# Patient Record
Sex: MALE | Race: WHITE | Employment: FULL TIME | URBAN - NONMETROPOLITAN AREA
[De-identification: names, ages, dates, MRNs, and addresses within clinical notes are randomized per-mention and may not be internally consistent; named-entity substitution may affect disease eponyms.]

---

## 2019-11-18 ENCOUNTER — OFFICE VISIT (OUTPATIENT)
Dept: FAMILY MEDICINE CLINIC | Facility: CLINIC | Age: 54
End: 2019-11-18
Payer: COMMERCIAL

## 2019-11-18 VITALS
SYSTOLIC BLOOD PRESSURE: 136 MMHG | BODY MASS INDEX: 21.42 KG/M2 | HEART RATE: 88 BPM | RESPIRATION RATE: 16 BRPM | WEIGHT: 127 LBS | HEIGHT: 64.5 IN | DIASTOLIC BLOOD PRESSURE: 88 MMHG | TEMPERATURE: 99 F

## 2019-11-18 DIAGNOSIS — F41.8 ANXIETY WITH DEPRESSION: ICD-10-CM

## 2019-11-18 DIAGNOSIS — M47.812 ARTHRITIS OF NECK: ICD-10-CM

## 2019-11-18 DIAGNOSIS — M54.2 NECK PAIN: Primary | ICD-10-CM

## 2019-11-18 DIAGNOSIS — F10.10 ETOH ABUSE: ICD-10-CM

## 2019-11-18 DIAGNOSIS — G89.29 CHRONIC BACK PAIN GREATER THAN 3 MONTHS DURATION: ICD-10-CM

## 2019-11-18 DIAGNOSIS — M54.9 CHRONIC BACK PAIN GREATER THAN 3 MONTHS DURATION: ICD-10-CM

## 2019-11-18 DIAGNOSIS — Z72.0 TOBACCO ABUSE: ICD-10-CM

## 2019-11-18 PROCEDURE — 99204 OFFICE O/P NEW MOD 45 MIN: CPT | Performed by: FAMILY MEDICINE

## 2019-11-18 RX ORDER — VENLAFAXINE HYDROCHLORIDE 75 MG/1
75 CAPSULE, EXTENDED RELEASE ORAL DAILY
Qty: 30 CAPSULE | Refills: 2 | Status: SHIPPED | OUTPATIENT
Start: 2019-11-18 | End: 2019-12-09 | Stop reason: ALTCHOICE

## 2019-11-18 RX ORDER — HYDROCODONE BITARTRATE AND ACETAMINOPHEN 5; 325 MG/1; MG/1
1 TABLET ORAL 3 TIMES DAILY
COMMUNITY
Start: 2018-07-05 | End: 2019-12-10

## 2019-11-18 RX ORDER — ALPRAZOLAM 1 MG/1
1 TABLET ORAL 2 TIMES DAILY
COMMUNITY
End: 2019-12-03

## 2019-11-18 RX ORDER — VENLAFAXINE HYDROCHLORIDE 37.5 MG/1
37.5 CAPSULE, EXTENDED RELEASE ORAL DAILY
COMMUNITY
Start: 2019-10-22 | End: 2019-11-18

## 2019-11-18 NOTE — PROGRESS NOTES
HPI:    Patient ID: Kia Vinson is a 47year old male. Looking for new Dr  Neck pain x 1 yr w/o trauma  Depression  Chronic back pain  No recent trauma. Current doctor retiring. Without thoughts of hurting himself or others. Sleep okay.   Been on curr Venlafaxine HCl ER 75 MG Oral Capsule SR 24 Hr 30 capsule 2     Sig: Take 1 capsule (75 mg total) by mouth daily.        Imaging & Referrals:  [address] PAIN [facility]ANGEMENT       [facility]ALISSON#[id number]

## 2019-11-25 ENCOUNTER — TELEPHONE (OUTPATIENT)
Dept: FAMILY MEDICINE CLINIC | Facility: CLINIC | Age: 54
End: 2019-11-25

## 2019-11-26 ENCOUNTER — TELEPHONE (OUTPATIENT)
Dept: FAMILY MEDICINE CLINIC | Facility: CLINIC | Age: 54
End: 2019-11-26

## 2019-11-26 NOTE — TELEPHONE ENCOUNTER
Faxed records req. to Dr. Huang Pitch office to send records to [facility] for Dr. Jennifer Bermudez to review.  fax [phone]

## 2019-11-29 ENCOUNTER — TELEPHONE (OUTPATIENT)
Dept: FAMILY MEDICINE CLINIC | Facility: CLINIC | Age: 54
End: 2019-11-29

## 2019-11-29 NOTE — TELEPHONE ENCOUNTER
Patient instructed Dr Ady Dorado referred him to pain medicine specialist for chronic pain management; he will not fill Hydrocodone. Patient comments, \"How do I get this med? \"  Told him not from Dr Ady Dorado; where has he gotten it before? Dr Alessia Sanz.   He is goi

## 2019-11-29 NOTE — TELEPHONE ENCOUNTER
Per dispense history and Epic, Dr Raven Abdi has not give Haim Pate to Pt (new Pt on 11-18-19)    This is given by Dr Kina Saunders, last dispensed 11-9-19 # 90 TABLETS (LiSTED AS 30 DAY SUPPLY)  Medication Dispense Information     HYDROCODONE BITARTRATE-ACETAMINOPHE

## 2019-12-03 NOTE — TELEPHONE ENCOUNTER
Neal Richardson Nurse   Caller: Unspecified (Today,  8:37 AM)             PT CALLED BACK & SAID ALPRAZolam IS 1.0 MG      Per chart medication refilled correctly

## 2019-12-03 NOTE — TELEPHONE ENCOUNTER
LEFT MESSAGE TO CALL OFFICE-  NEEDS TO BE INSTRUCTED TO ONLY TAKE ALPRAZOLAM IF NEEDED; NO LONGER BID DOSING. 30 TABS TO LAST 30 DAYS. ALSO, HAS HE HEARD FROM BEHAVIORAL HEALTH?

## 2019-12-03 NOTE — TELEPHONE ENCOUNTER
Ov 11/18/2019 11/29/2019 hydrocodone refilled #15 by a Dr Starkey Pulse   1 tab po q 8 hrs PRN severe pain only    Requesting Xanax refill

## 2019-12-05 NOTE — TELEPHONE ENCOUNTER
Raymundo Corner Nurse   Caller: Unspecified (Yesterday,  2:03 PM)             DR Rachel Feliciano     HE RETURNED A CALL FROM YESTERDAY AND HE SAID THAT HE WILL ONLY TAKE 1 ALPRAZOLAM A DAY NOW AND IT WILL BE AT NIGHTTIME

## 2019-12-09 ENCOUNTER — TELEPHONE (OUTPATIENT)
Dept: SURGERY | Facility: CLINIC | Age: 54
End: 2019-12-09

## 2019-12-09 NOTE — TELEPHONE ENCOUNTER
Found record of celexa 10mg from ER visit- reported to Dr John Brothers. Advised- go ahead and order celexa. Discontinue the effexor. Be sure and talk to [facility] Navigator to get set up with psychiatry for medication regulation.   Patient comments he is

## 2019-12-09 NOTE — TELEPHONE ENCOUNTER
Per Dr Rossi Chauhan, the # he should call for that is [phone]. We do not schedule those appts here. When I returned to tell patient, he'd hung up already.

## 2019-12-09 NOTE — TELEPHONE ENCOUNTER
Checked with Shimon Knox at [facility] and they left 2 messages with patient and he never called them back. They will call again. Detailed message left on patient's cell- he needs to go through psyche for this type of medication.   Regi

## 2019-12-10 ENCOUNTER — TELEPHONE (OUTPATIENT)
Dept: FAMILY MEDICINE CLINIC | Facility: CLINIC | Age: 54
End: 2019-12-10

## 2019-12-10 ENCOUNTER — OFFICE VISIT (OUTPATIENT)
Dept: PAIN CLINIC | Facility: CLINIC | Age: 54
End: 2019-12-10
Payer: COMMERCIAL

## 2019-12-10 VITALS
HEIGHT: 64 IN | BODY MASS INDEX: 22.02 KG/M2 | WEIGHT: 129 LBS | DIASTOLIC BLOOD PRESSURE: 80 MMHG | OXYGEN SATURATION: 98 % | SYSTOLIC BLOOD PRESSURE: 140 MMHG | HEART RATE: 89 BPM

## 2019-12-10 DIAGNOSIS — M54.9 CHRONIC BACK PAIN GREATER THAN 3 MONTHS DURATION: ICD-10-CM

## 2019-12-10 DIAGNOSIS — M47.812 ARTHRITIS OF NECK: ICD-10-CM

## 2019-12-10 DIAGNOSIS — M54.12 CERVICAL RADICULOPATHY: Primary | ICD-10-CM

## 2019-12-10 DIAGNOSIS — G89.29 CHRONIC BACK PAIN GREATER THAN 3 MONTHS DURATION: ICD-10-CM

## 2019-12-10 DIAGNOSIS — M54.2 NECK PAIN: ICD-10-CM

## 2019-12-10 PROCEDURE — 99203 OFFICE O/P NEW LOW 30 MIN: CPT | Performed by: ANESTHESIOLOGY

## 2019-12-10 RX ORDER — HYDROCODONE BITARTRATE AND ACETAMINOPHEN 5; 325 MG/1; MG/1
1 TABLET ORAL EVERY 8 HOURS PRN
Qty: 42 TABLET | Refills: 0 | Status: SHIPPED | OUTPATIENT
Start: 2019-12-10 | End: 2019-12-31

## 2019-12-10 NOTE — TELEPHONE ENCOUNTER
appt made for 2 weeks for recheck regarding citalopram.  Dr Barney Candelaria is still wanting psychiatry to see him and manage meds.   Dr Barney Candelaria aware

## 2019-12-10 NOTE — TELEPHONE ENCOUNTER
PER CALL FROM DR Colin Salinas OFFICE,  PATIENT'S RECORDS WERE COMPLETED AND MAILED OUT TO OUR OFFICE FOR  [facility] ON  12/9/19

## 2019-12-10 NOTE — TELEPHONE ENCOUNTER
Tressa Mattson was wondering why his dose of Citalopram Hydrobromide (CELEXA) 10 MG Oral Tab was lowered to 10 mg when his previous Dr had him on 20 mg?

## 2019-12-10 NOTE — PROGRESS NOTES
PHYSICAL EXAM:   Physical Exam   Constitutional:           Patient presents in office today with reported pain in neck, right arm, both hands    Current pain level reported = 8/10    Last reported dose of HYDROcodone-acetaminophen 5-325 MG Oral Tab yeste

## 2019-12-10 NOTE — H&P
Name: Gaetano Foy   : 1965   DOS: 12/10/2019     Chief complaint: Cervical radiculopathy    History of present illness:  Gaetano Foy is a 47year old male with a 2 year history of neck pain with progressive radicular symptoms referred for injec 140/80 (BP Location: Right arm, Patient Position: Sitting, Cuff Size: adult)   Pulse 89   Ht 64\"   Wt 129 lb (58.5 kg)   SpO2 98%   BMI 22.14 kg/m²    The patient is awake, alert, oriented and corporative. He has a normal affect.  The patient ambulates wit

## 2019-12-11 ENCOUNTER — TELEPHONE (OUTPATIENT)
Dept: FAMILY MEDICINE CLINIC | Facility: CLINIC | Age: 54
End: 2019-12-11

## 2019-12-11 NOTE — TELEPHONE ENCOUNTER
PT.IS TRYING TO GET A HOLD OF THE PSYCH AT [place] AND HAS TO KEEP LEAVING A VOICE MESSAGE, HE IS ASKING IF THERE IS ANOTHER NUMBER HE CAN CALL

## 2019-12-20 ENCOUNTER — TELEPHONE (OUTPATIENT)
Dept: FAMILY MEDICINE CLINIC | Facility: CLINIC | Age: 54
End: 2019-12-20

## 2019-12-20 NOTE — TELEPHONE ENCOUNTER
KEYONA--PT XLD APPT HERE FOR FOLLOW UP ON 12/23, CELEXA HE IS ON IS WORKING WELL, HE IS GOING TO MAKE APPT TO SEE PSYCH IN [place]

## 2019-12-23 ENCOUNTER — TELEPHONE (OUTPATIENT)
Dept: FAMILY MEDICINE CLINIC | Facility: CLINIC | Age: 54
End: 2019-12-23

## 2019-12-23 NOTE — TELEPHONE ENCOUNTER
Patient is returning a nurse call. Please return patients call at 2:30 pm he will be on brake then but only has 10 minutes for brake. States that the Celexa has been working great for him.

## 2019-12-23 NOTE — TELEPHONE ENCOUNTER
Detailed message left that Dr Em Burns aware patient cancelled appt with him; be sure and keep appt with psychiatrist.  If needs refill before see's psyche then please call and reschedule with Dr Em Burns.   ej/cj

## 2019-12-24 ENCOUNTER — HOSPITAL ENCOUNTER (OUTPATIENT)
Dept: MRI IMAGING | Facility: HOSPITAL | Age: 54
Discharge: HOME | End: 2019-12-24
Attending: ANESTHESIOLOGY
Payer: COMMERCIAL

## 2019-12-24 ENCOUNTER — TELEPHONE (OUTPATIENT)
Dept: FAMILY MEDICINE CLINIC | Facility: CLINIC | Age: 54
End: 2019-12-24

## 2019-12-24 DIAGNOSIS — M54.12 CERVICAL RADICULOPATHY: ICD-10-CM

## 2019-12-24 PROCEDURE — 72141 MRI NECK SPINE W/O DYE: CPT | Performed by: ANESTHESIOLOGY

## 2019-12-24 NOTE — TELEPHONE ENCOUNTER
PT. JUST GOT DONE WITH THE MRI BUT HE CALLED TO GET IN WITH THE PSYCH AND THEY CANNOT GET HIM IN UNTIL FEB. HE SAID HE WILL RUN OUT OF HIS MEDS BEFORE THAT. Citalopram Hydrobromide (CELEXA) 10 MG Oral Tab  ALPRAZolam 1 MG Oral Tab  WILL   CALL THAT IN [employer]

## 2019-12-26 ENCOUNTER — TELEPHONE (OUTPATIENT)
Dept: PAIN CLINIC | Facility: CLINIC | Age: 54
End: 2019-12-26

## 2019-12-26 DIAGNOSIS — M54.12 CERVICAL RADICULITIS: Primary | ICD-10-CM

## 2019-12-26 NOTE — TELEPHONE ENCOUNTER
Medical clearance needed- no     Implanted cardiac device/SCS/PNS: n/a    Pt seen in OV  by Dr. Humza Mcdowell and recommended for ISSA (X 1). Please begin PA process for procedure(s).      Laterality: n/a  Level(s): n/a    Pt informed callback will be given when PA

## 2019-12-26 NOTE — TELEPHONE ENCOUNTER
Called and left patient voice message regarding his cervical MRI results. Advised patient to call the office if he desires cervical epidural steroid injection. He may ultimately need surgical decompression.

## 2019-12-26 NOTE — TELEPHONE ENCOUNTER
Pt calling to schedule injections, please call back; informed pt order will need to be placed and PA started, pt agreed to wait for callback

## 2019-12-27 NOTE — TELEPHONE ENCOUNTER
Started PA through [facility] for Agusto ([id number]) , Uploaded Clinicals    Tracking #:[id number]

## 2019-12-30 ENCOUNTER — TELEPHONE (OUTPATIENT)
Dept: FAMILY MEDICINE CLINIC | Facility: CLINIC | Age: 54
End: 2019-12-30

## 2019-12-30 ENCOUNTER — TELEPHONE (OUTPATIENT)
Dept: SURGERY | Facility: CLINIC | Age: 54
End: 2019-12-30

## 2019-12-30 NOTE — TELEPHONE ENCOUNTER
Prior authorization request completed for: ISSA  Authorization #[id number]     Authorization dates: 01/08/20  CPT codes approved: 49906  Number of visits/dates of service approved: 1  Physician: Humza Mcdowell     Patient can be scheduled     *authorization is date

## 2019-12-30 NOTE — TELEPHONE ENCOUNTER
Rema Lott MD  You 13 minutes ago (9:40 AM)      He should make an appointment to discuss his cervical MRI results and sign contract. Can you have Viola or Sherry write a 7 day script for him to bridge him to appointment.  I called an left VM regarding his M

## 2019-12-30 NOTE — TELEPHONE ENCOUNTER
Detailed message left that Dr Waller Route out of office till Thursday. He needs to get pain meds from pain medicine specialist, Dr Cheynene Orosco. Can see that he has put a call into his office. Only Dr Cheyenne Orosco should be managing his chronic pain.

## 2019-12-31 ENCOUNTER — MED REC SCAN ONLY (OUTPATIENT)
Dept: PAIN CLINIC | Facility: CLINIC | Age: 54
End: 2019-12-31

## 2019-12-31 ENCOUNTER — APPOINTMENT (OUTPATIENT)
Dept: LAB | Facility: HOSPITAL | Age: 54
End: 2019-12-31
Attending: NURSE PRACTITIONER
Payer: COMMERCIAL

## 2019-12-31 ENCOUNTER — OFFICE VISIT (OUTPATIENT)
Dept: PAIN CLINIC | Facility: CLINIC | Age: 54
End: 2019-12-31
Payer: COMMERCIAL

## 2019-12-31 VITALS
DIASTOLIC BLOOD PRESSURE: 78 MMHG | BODY MASS INDEX: 21.66 KG/M2 | WEIGHT: 130 LBS | HEIGHT: 65 IN | HEART RATE: 77 BPM | SYSTOLIC BLOOD PRESSURE: 131 MMHG | OXYGEN SATURATION: 96 %

## 2019-12-31 DIAGNOSIS — F11.90 CHRONIC NARCOTIC USE: ICD-10-CM

## 2019-12-31 DIAGNOSIS — M54.12 CERVICAL RADICULOPATHY: Primary | ICD-10-CM

## 2019-12-31 DIAGNOSIS — G99.2 STENOSIS OF CERVICAL SPINE WITH MYELOPATHY (HCC): ICD-10-CM

## 2019-12-31 DIAGNOSIS — M48.02 STENOSIS OF CERVICAL SPINE WITH MYELOPATHY (HCC): ICD-10-CM

## 2019-12-31 PROCEDURE — 99214 OFFICE O/P EST MOD 30 MIN: CPT | Performed by: NURSE PRACTITIONER

## 2019-12-31 PROCEDURE — 80307 DRUG TEST PRSMV CHEM ANLYZR: CPT

## 2019-12-31 RX ORDER — METHOCARBAMOL 500 MG/1
500 TABLET, FILM COATED ORAL 3 TIMES DAILY PRN
Qty: 90 TABLET | Refills: 0 | Status: SHIPPED | OUTPATIENT
Start: 2019-12-31 | End: 2020-02-06

## 2019-12-31 RX ORDER — HYDROCODONE BITARTRATE AND ACETAMINOPHEN 5; 325 MG/1; MG/1
1 TABLET ORAL EVERY 12 HOURS PRN
Qty: 60 TABLET | Refills: 0 | Status: SHIPPED | OUTPATIENT
Start: 2019-12-31 | End: 2020-01-27

## 2019-12-31 RX ORDER — GABAPENTIN 300 MG/1
300 CAPSULE ORAL 3 TIMES DAILY
Qty: 90 CAPSULE | Refills: 0 | Status: SHIPPED | OUTPATIENT
Start: 2019-12-31 | End: 2020-02-06

## 2019-12-31 NOTE — PROGRESS NOTES
Patient presents in office today with reported pain in upper neck. Pain radiates down arms (right worse than left). Numbness and tingling down arms into fingers (right worse than left). Increased activity worsens symptoms.  Patient reports a lot of stiffnes

## 2019-12-31 NOTE — PROGRESS NOTES
Name: Raymon Chávez   : 1965   DOS: 2019     Pain Clinic Follow Up Visit:   Raymon Chávez is a 47year old male who presents for MRI review. Patient continue to c/o pain and stiffness to the neck that radiates down both arms.  He also has To Mcghee does state that if we can do one ISSA if patient would like, but not sure how much relief it will give and if it did give relief not sure how long it would last. Also advised patient that the chance of pain getting wore with injection is also high con consultations:NEUROSURGERY - INTERNAL    The patient indicates understanding of these issues and agrees to the plan. Return for 2-4 weeks post procedure.     FERNANDO Tinoco, 12/31/2019, 12:11 PM

## 2019-12-31 NOTE — PROGRESS NOTES
Pt here in office for visit with Rossi COTTER. Reviewed pain agreement with pt. Pt verbalized understanding and signed. Copy provided. Pt sent to lab for urine specimen. Handed Rx to patient  Document sent to scanning. Snapshot and FYI updated.

## 2020-01-02 LAB
6-ACETYLMORHINE CUTOFF 20 NG/ML: NOT DETECTED
7-AMINOCLONAZEPAM 40 NG/ML: NOT DETECTED
A-OH-ALPRAZOLM CUTOFF 20 NG/ML: PRESENT
ALPRAZOLAM CUTOFF 40 NG/ML: PRESENT
AMPHETAMINE CUTOFF 10 NG/ML: NOT DETECTED
BARBITURATES CUTOFF 200 NG/ML: NOT DETECTED
BENZOYLECGONINE  150 NG/ML: NOT DETECTED
BUPRENORPHINE CUTOFF 5 NG/ML: NOT DETECTED
CARISOPRODOL CUTOFF 100 NG/ML: NOT DETECTED
CODINE CUTOFF 40 NG/ML: NOT DETECTED
COLNAZEPAM CUTOFF 20 NG/ML: NOT DETECTED
CREATININE,URINE: 29 MG/DL
DIAZEPAM CUTOFF 50 NG/ML: NOT DETECTED
ETHYL-GLUCURONIDE 500 NG/ML: PRESENT
FENTANYL CUTOFF 2 NG/ML: NOT DETECTED
HYDROCODONE CUTOFF 40 NG/ML: PRESENT
HYDROMORPHONE CUTOFF 40 NG/ML: NOT DETECTED
LORAZEPAM CUTOFF 60 NG/ML: NOT DETECTED
MARIJUANA CUTOFF 20 NG/ML: NOT DETECTED
MDA CUTOFF 200 NG/ML: NOT DETECTED
MDEA EVE CUTOFF 200 NG/ML: NOT DETECTED
MDMA-ECSTASY CUTOFF 200 NG/ML: NOT DETECTED
MEPERIDINE MET CUTOFF 50 NG/ML: NOT DETECTED
METHADONE CUTOFF 150 NG/ML: NOT DETECTED
METHAMPHETMN CUTOFF 400 NG/ML: NOT DETECTED
METHYLPHENIDATE (CUTOFF 100 NG/ML): NOT DETECTED
MIDAZOLAM CUTOFF 20 NG/ML: NOT DETECTED
MORHINE CUTOFF 20 NG/ML: NOT DETECTED
NORBUPRENORPHINE  20 NG/ML: NOT DETECTED
NORDIAZEPAM CUTOFF 50 NG/ML: NOT DETECTED
NORFENTANYL CUTOFF 2 NG/ML: NOT DETECTED
NORHYDRCODONE CUTOFF 100 NG/ML: PRESENT
NOROXYCODONE CUTOFF 100 NG/ML: NOT DETECTED
NOROXYMORPHNE CUTOFF 100 NG/ML: NOT DETECTED
OXAZEPAM CUTOFF 50 NG/ML: NOT DETECTED
OXYCODONE CUTOFF 40 NG/ML: NOT DETECTED
OXYMORPHONE CUTOFF 40 NG/ML: NOT DETECTED
PCP CUTOFF 25 NG/ML: NOT DETECTED
PHENTERMINE CUTOFF 100 NG/ML: NOT DETECTED
PROPOXYPHENE CUTOFF 300 NG/ML: NOT DETECTED
TAPENTADOL CUTOFF 100 NG/ML: NOT DETECTED
TAPENTADOL-O SULF 200 NG/ML: NOT DETECTED
TEMAZEPAM CUTOFF 50 NG/ML: NOT DETECTED
TRAMADOL CUTOFF 200 NG/ML: NOT DETECTED
ZOLPIDEM CUTOFF 20 NG/ML: NOT DETECTED

## 2020-01-03 DIAGNOSIS — F41.8 ANXIETY WITH DEPRESSION: ICD-10-CM

## 2020-01-03 RX ORDER — ALPRAZOLAM 1 MG/1
1 TABLET ORAL 2 TIMES DAILY PRN
Qty: 30 TABLET | Refills: 0 | Status: SHIPPED | OUTPATIENT
Start: 2020-01-03 | End: 2021-03-05

## 2020-01-06 DIAGNOSIS — F41.8 ANXIETY WITH DEPRESSION: ICD-10-CM

## 2020-01-06 NOTE — TELEPHONE ENCOUNTER
Spoke with Keren Mock at [facility]  Reference #:[id number]    [address]    Updated DOS to 01/13/20

## 2020-01-06 NOTE — TELEPHONE ENCOUNTER
Patient returned call, advised of approval and is agreeable to scheduling. Procedure scheduled, pre-procedure instructions reviewed. Patient prefers conscious sedation, reviewed ride and fasting requirements. Patient has no listed medications to hold.  Judy ? Please bring your Insurance Card, Photo ID, List of Current Medications and Referral (if applicable) to your appointment. Check in at [facility] ([address] [place]) outpatient registration in the [employer].   ? Please note-No p Most insurances are now requiring a preauthorization for all procedures. In the event that your insurance does not authorize your procedure within 48 hours of the scheduled date, your procedure will be cancelled and rescheduled to a later date.    Please

## 2020-01-07 RX ORDER — CITALOPRAM 10 MG/1
10 TABLET ORAL DAILY
Qty: 90 TABLET | Refills: 1 | Status: SHIPPED | OUTPATIENT
Start: 2020-01-07

## 2020-01-08 ENCOUNTER — TELEPHONE (OUTPATIENT)
Dept: PAIN CLINIC | Facility: CLINIC | Age: 55
End: 2020-01-08

## 2020-01-08 NOTE — TELEPHONE ENCOUNTER
Left message for patient, confirmed procedure date of 1/13/20 and to be checked in at outpatient registration at [address] am with patient's . Encouraged patient to listen to the pre-procedure line at [phone].  Patient instructed to call office if the

## 2020-01-09 ENCOUNTER — OFFICE VISIT (OUTPATIENT)
Dept: FAMILY MEDICINE CLINIC | Facility: CLINIC | Age: 55
End: 2020-01-09
Payer: COMMERCIAL

## 2020-01-09 VITALS
SYSTOLIC BLOOD PRESSURE: 134 MMHG | BODY MASS INDEX: 22 KG/M2 | RESPIRATION RATE: 16 BRPM | DIASTOLIC BLOOD PRESSURE: 84 MMHG | TEMPERATURE: 98 F | WEIGHT: 133.5 LBS | HEART RATE: 88 BPM

## 2020-01-09 DIAGNOSIS — L03.032 CELLULITIS OF TOE OF LEFT FOOT: Primary | ICD-10-CM

## 2020-01-09 PROCEDURE — 99213 OFFICE O/P EST LOW 20 MIN: CPT | Performed by: FAMILY MEDICINE

## 2020-01-09 RX ORDER — CEPHALEXIN 500 MG/1
500 CAPSULE ORAL 3 TIMES DAILY
Qty: 30 CAPSULE | Refills: 0 | Status: SHIPPED | OUTPATIENT
Start: 2020-01-09 | End: 2020-01-27 | Stop reason: ALTCHOICE

## 2020-01-09 NOTE — PROGRESS NOTES
HPI:    Patient ID: Gab Law is a 47year old male.   Increasing pain / swelling L lat 1st MP foot  Worked on callous w/ knife  W/o fever / chills    HPI    Review of Systems         Current Outpatient Medications   Medication Sig Dispense Refill   • capsule (500 mg total) by mouth 3 (three) times daily.        Imaging & Referrals:  None       #[id number]

## 2020-01-13 ENCOUNTER — HOSPITAL ENCOUNTER (OUTPATIENT)
Facility: HOSPITAL | Age: 55
Setting detail: HOSPITAL OUTPATIENT SURGERY
Discharge: HOME | End: 2020-01-13
Attending: ANESTHESIOLOGY | Admitting: ANESTHESIOLOGY
Payer: COMMERCIAL

## 2020-01-13 ENCOUNTER — APPOINTMENT (OUTPATIENT)
Dept: GENERAL RADIOLOGY | Facility: HOSPITAL | Age: 55
End: 2020-01-13
Attending: ANESTHESIOLOGY
Payer: COMMERCIAL

## 2020-01-13 ENCOUNTER — TELEPHONE (OUTPATIENT)
Dept: SURGERY | Facility: CLINIC | Age: 55
End: 2020-01-13

## 2020-01-13 VITALS
SYSTOLIC BLOOD PRESSURE: 155 MMHG | DIASTOLIC BLOOD PRESSURE: 91 MMHG | HEART RATE: 83 BPM | OXYGEN SATURATION: 96 % | TEMPERATURE: 99 F | RESPIRATION RATE: 18 BRPM

## 2020-01-13 DIAGNOSIS — M54.12 CERVICAL RADICULITIS: ICD-10-CM

## 2020-01-13 PROCEDURE — 3E0S33Z INTRODUCTION OF ANTI-INFLAMMATORY INTO EPIDURAL SPACE, PERCUTANEOUS APPROACH: ICD-10-PCS | Performed by: ANESTHESIOLOGY

## 2020-01-13 PROCEDURE — 99152 MOD SED SAME PHYS/QHP 5/>YRS: CPT | Performed by: ANESTHESIOLOGY

## 2020-01-13 RX ORDER — ONDANSETRON 2 MG/ML
4 INJECTION INTRAMUSCULAR; INTRAVENOUS ONCE AS NEEDED
Status: DISCONTINUED | OUTPATIENT
Start: 2020-01-13 | End: 2020-01-13

## 2020-01-13 RX ORDER — SODIUM CHLORIDE, SODIUM LACTATE, POTASSIUM CHLORIDE, CALCIUM CHLORIDE 600; 310; 30; 20 MG/100ML; MG/100ML; MG/100ML; MG/100ML
100 INJECTION, SOLUTION INTRAVENOUS CONTINUOUS
Status: DISCONTINUED | OUTPATIENT
Start: 2020-01-13 | End: 2020-01-13

## 2020-01-13 RX ORDER — MIDAZOLAM HYDROCHLORIDE 1 MG/ML
INJECTION INTRAMUSCULAR; INTRAVENOUS AS NEEDED
Status: DISCONTINUED | OUTPATIENT
Start: 2020-01-13 | End: 2020-01-13

## 2020-01-13 RX ORDER — DIPHENHYDRAMINE HYDROCHLORIDE 50 MG/ML
50 INJECTION INTRAMUSCULAR; INTRAVENOUS ONCE AS NEEDED
Status: DISCONTINUED | OUTPATIENT
Start: 2020-01-13 | End: 2020-01-13

## 2020-01-13 RX ORDER — DEXAMETHASONE SODIUM PHOSPHATE 10 MG/ML
INJECTION, SOLUTION INTRAMUSCULAR; INTRAVENOUS AS NEEDED
Status: DISCONTINUED | OUTPATIENT
Start: 2020-01-13 | End: 2020-01-13

## 2020-01-13 RX ORDER — 0.9 % SODIUM CHLORIDE 0.9 %
VIAL (ML) INJECTION AS NEEDED
Status: DISCONTINUED | OUTPATIENT
Start: 2020-01-13 | End: 2020-01-13

## 2020-01-13 RX ORDER — LIDOCAINE HYDROCHLORIDE 10 MG/ML
INJECTION, SOLUTION EPIDURAL; INFILTRATION; INTRACAUDAL; PERINEURAL AS NEEDED
Status: DISCONTINUED | OUTPATIENT
Start: 2020-01-13 | End: 2020-01-13

## 2020-01-13 NOTE — TELEPHONE ENCOUNTER
Pt stopped up here, had injection done today. Would like rx. Lives very far away and is already here. Can this be done? Pt in waiting room now.

## 2020-01-13 NOTE — TELEPHONE ENCOUNTER
Noted patient last picked up Rx for Hydrocodone 12/31/19     Not due for refill until 1/29/20    Spoke with patient and informed Rx cannot be released two weeks early and patient will need to be back to  Rx on 1/28/20 . He verbalized understanding.

## 2020-01-13 NOTE — OPERATIVE REPORT
[facility]  Operative Report  2020     [place] Nurse Patient Status:  Hospital Outpatient Surgery    1965 MRN [id number]   [facility] ENDOSCOPY Attending Nasima Milian MD   Hosp Day # 0 PCP Kylah Flowers DO     Indication: Ro obtaining a good epidurogram by injecting Omnipaque 180 1 mL, a combination of normal saline and dexamethasone 10 mg in total volume of 4 mL was injected. The needle was then flushed with normal saline 1 mL. The stylet re-applied.   The needle was withdra

## 2020-01-13 NOTE — H&P
History & Physical Examination    Patient Name: Kaity Navarro  MRN: [id number]  CSN: [id number]  YOB: 1965    Pre-Operative Diagnosis:  Cervical radiculitis [M54.12]    Present Illness: Patient with cervical radiculopathy for epidural steroi Frequency: 4 or more times a week      Drinks per session: 7 to 9      Binge frequency: Daily or almost daily      SYSTEM Check if Review is Normal Check if Physical Exam is Normal If not normal, please explain:   HEENT [x ] [x ]    NECK & BACK [x ] [x ]

## 2020-01-25 ENCOUNTER — TELEPHONE (OUTPATIENT)
Dept: FAMILY MEDICINE CLINIC | Facility: CLINIC | Age: 55
End: 2020-01-25

## 2020-01-25 NOTE — TELEPHONE ENCOUNTER
Luis Nixon is calling because he doesn't think that the Cephalexin 500 mg is not helping. Luis Nixon says that his left toe is still having the stinging burning feeling.  If you call during the week he is able to answer his phone at these times 8:30 11:30 2:30 4:30

## 2020-01-27 ENCOUNTER — OFFICE VISIT (OUTPATIENT)
Dept: FAMILY MEDICINE CLINIC | Facility: CLINIC | Age: 55
End: 2020-01-27
Payer: COMMERCIAL

## 2020-01-27 VITALS
WEIGHT: 128.5 LBS | HEART RATE: 98 BPM | TEMPERATURE: 99 F | BODY MASS INDEX: 21.67 KG/M2 | HEIGHT: 64.5 IN | DIASTOLIC BLOOD PRESSURE: 86 MMHG | OXYGEN SATURATION: 97 % | SYSTOLIC BLOOD PRESSURE: 138 MMHG

## 2020-01-27 DIAGNOSIS — G57.62 MORTON'S NEUROMA OF LEFT FOOT: Primary | ICD-10-CM

## 2020-01-27 DIAGNOSIS — M21.612 BUNION OF GREAT TOE OF LEFT FOOT: ICD-10-CM

## 2020-01-27 DIAGNOSIS — M79.672 LEFT FOOT PAIN: ICD-10-CM

## 2020-01-27 PROCEDURE — 99214 OFFICE O/P EST MOD 30 MIN: CPT | Performed by: FAMILY MEDICINE

## 2020-01-27 PROCEDURE — 20550 NJX 1 TENDON SHEATH/LIGAMENT: CPT | Performed by: FAMILY MEDICINE

## 2020-01-27 RX ORDER — HYDROCODONE BITARTRATE AND ACETAMINOPHEN 5; 325 MG/1; MG/1
1 TABLET ORAL EVERY 12 HOURS PRN
Qty: 60 TABLET | Refills: 0 | Status: SHIPPED | OUTPATIENT
Start: 2020-01-29 | End: 2020-02-28

## 2020-01-27 RX ORDER — TRIAMCINOLONE ACETONIDE 40 MG/ML
20 INJECTION, SUSPENSION INTRA-ARTICULAR; INTRAMUSCULAR ONCE
Status: COMPLETED | OUTPATIENT
Start: 2020-01-27 | End: 2020-01-27

## 2020-01-27 RX ADMIN — TRIAMCINOLONE ACETONIDE 20 MG: 40 INJECTION, SUSPENSION INTRA-ARTICULAR; INTRAMUSCULAR at 17:26:00

## 2020-01-27 NOTE — PROGRESS NOTES
HPI:    Patient ID: Kareen Tenorio is a 47year old male. Pt c/o L mid foot pain / w/o trauma  Decrease pain / redness - L bunion  HPI    Review of Systems   Constitutional: Negative for chills and fever.    Neurological: Negative for weakness and numbnes Imaging & Referrals:  None       VY#[id number]

## 2020-01-27 NOTE — TELEPHONE ENCOUNTER
Medication: HYDROcodone-acetaminophen 5-325 MG Oral Tab    Date of last refill: 12/31/19  [place] 37[place]30/00    Last office visit: 12/31/19  Due back to clinic per last office note: Return for 2-4 weeks post procedure.    to see if there is any sedating effects prior to taking during the day. 5) Start methocarbamol 500mg TID PRN.   6) F/U in clinic 2-4 weeks post procedure for re-evaluation.         [place].     Orders:Orders Placed This Encounter      Pain Man

## 2020-01-28 ENCOUNTER — OFFICE VISIT (OUTPATIENT)
Dept: SURGERY | Facility: CLINIC | Age: 55
End: 2020-01-28
Payer: COMMERCIAL

## 2020-01-28 VITALS
SYSTOLIC BLOOD PRESSURE: 142 MMHG | WEIGHT: 128 LBS | HEIGHT: 64.5 IN | HEART RATE: 83 BPM | DIASTOLIC BLOOD PRESSURE: 90 MMHG | BODY MASS INDEX: 21.59 KG/M2

## 2020-01-28 DIAGNOSIS — R29.898 HAND WEAKNESS: ICD-10-CM

## 2020-01-28 DIAGNOSIS — M48.02 FORAMINAL STENOSIS OF CERVICAL REGION: ICD-10-CM

## 2020-01-28 DIAGNOSIS — G99.2 MYELOPATHY CONCURRENT WITH AND DUE TO SPINAL STENOSIS OF CERVICAL REGION (HCC): Primary | ICD-10-CM

## 2020-01-28 DIAGNOSIS — R29.818 POSITIVE LHERMITTE'S SIGN: ICD-10-CM

## 2020-01-28 DIAGNOSIS — M50.30 DDD (DEGENERATIVE DISC DISEASE), CERVICAL: ICD-10-CM

## 2020-01-28 DIAGNOSIS — R20.0 NUMBNESS AND TINGLING IN BOTH HANDS: ICD-10-CM

## 2020-01-28 DIAGNOSIS — R20.2 NUMBNESS AND TINGLING IN BOTH HANDS: ICD-10-CM

## 2020-01-28 DIAGNOSIS — R29.2 HYPOREFLEXIA: ICD-10-CM

## 2020-01-28 DIAGNOSIS — M48.02 MYELOPATHY CONCURRENT WITH AND DUE TO SPINAL STENOSIS OF CERVICAL REGION (HCC): Primary | ICD-10-CM

## 2020-01-28 DIAGNOSIS — M54.12 CERVICAL RADICULOPATHY: ICD-10-CM

## 2020-01-28 PROCEDURE — 99204 OFFICE O/P NEW MOD 45 MIN: CPT | Performed by: PHYSICIAN ASSISTANT

## 2020-01-28 NOTE — PROGRESS NOTES
Location of Pain: pt states pain in neck radiating to right arm. Pt states numbness and tingling. Pt states weakness issues. No issues with B/B.      Date Pain Began: 2 years           Work Related:   No        Receiving Work Comp/Disability:   No    Numeri

## 2020-01-28 NOTE — PROGRESS NOTES
Patient: Dada Gambino  Medical Record Number: [id number]  YOB: 1965  PCP: Cat Huerta DO    Referring Physician: Mariela Friedman  Reason for visit: Neck pain    Dear Dr. Mariela Friedman: Thank you very much for requesting this consultation.  I defibrillator. No past medical history on file.    Past Surgical History:   Procedure Laterality Date   • CERVICAL EPIDURAL STEROID INJECTION N/A 1/13/2020    Performed by Ollie Rich MD at [facility] ENDOSCOPY   • HERNIA SURGERY      child      Family History review of systems done. Negative except what is outlined in the above HPI. PHYSICAL EXAMIMATION    vitals were not taken for this visit. GENERAL: Very pleasant patient is in no apparent distress. Sitting comfortably in the examination chair.    HEENT: Reversal the normal cervical lordosis is noted. 2 mm anterior listhesis of C3 over C4 and C4 over C5 along with 2 mm retrolisthesis of C5 over C6. Vertebral body heights are maintained throughout the cervical spine.   Disc spaces are maintained throu Positive Lhermitte's sign    (R29.2) Hyporeflexia    (M50.30) DDD (degenerative disc disease), cervical    (R20.0,  R20.2) Numbness and tingling in both hands    (R29.898) Hand weakness    PLAN:   1. Medication: None prescribed  2.  Imaging:    - Reviewed t sought out and thoroughly answered to satisfaction.        Total visit time: 45 min  More than 50% spent coordinating care, providing patient education, reviewing imaging, discussing further imaging, discussing injections with pain services, providing at [facility]

## 2020-01-30 ENCOUNTER — TELEPHONE (OUTPATIENT)
Dept: FAMILY MEDICINE CLINIC | Facility: CLINIC | Age: 55
End: 2020-01-30

## 2020-02-04 ENCOUNTER — TELEPHONE (OUTPATIENT)
Dept: NEUROLOGY | Facility: CLINIC | Age: 55
End: 2020-02-04

## 2020-02-04 DIAGNOSIS — M54.12 CERVICAL RADICULITIS: Primary | ICD-10-CM

## 2020-02-04 NOTE — TELEPHONE ENCOUNTER
PLAN:   1. Medication: None prescribed  2.  Imaging:                 - Reviewed today:                              - MRI cervical spine:                                            - Cervical DDD with loss of lordosis and cervical most likely acquired kypho

## 2020-02-04 NOTE — TELEPHONE ENCOUNTER
Kaylyn Florian MD  You 14 minutes ago (1:34 PM)      Yes we can schedule one more      Attempted to reach patient, no answer, no option to leave voicemail. Will contact patient to schedule additional ISSA once approval is received.       Medical clearance ne

## 2020-02-04 NOTE — TELEPHONE ENCOUNTER
Spoke to pt to inform him of below. Pt verbalized understanding and states he was mainly calling to provide office with new phone number. Pt reports discussing POC w/ NS and has been allowed to complete further injections.  Pt wanted to ensure that we are a

## 2020-02-05 NOTE — TELEPHONE ENCOUNTER
Started PA through [facility] for Agusto ([id number]) , Uploaded Clinicals     Tracking #: [id number]

## 2020-02-06 RX ORDER — METHOCARBAMOL 500 MG/1
500 TABLET, FILM COATED ORAL 3 TIMES DAILY PRN
Qty: 90 TABLET | Refills: 0 | Status: SHIPPED | OUTPATIENT
Start: 2020-02-06 | End: 2020-03-11

## 2020-02-06 RX ORDER — GABAPENTIN 300 MG/1
300 CAPSULE ORAL 3 TIMES DAILY
Qty: 90 CAPSULE | Refills: 0 | Status: SHIPPED | OUTPATIENT
Start: 2020-02-06 | End: 2020-03-11

## 2020-02-06 NOTE — TELEPHONE ENCOUNTER
Medication(s):       gabapentin 300 MG Oral Cap  Date of last refill: 12/31/19  [place]      methocarbamol 500 MG Oral Tab  Date of last refill: 12/31/2019  [place]      Last o while taking narcotics. Patient verbalizes understanding. 4) Start gabapentin 300mg TID. Take 1st dose at night to see if there is any sedating effects prior to taking during the day. 5) Start methocarbamol 500mg TID PRN.   6) F/U in clinic 2-4 weeks pos

## 2020-02-07 NOTE — TELEPHONE ENCOUNTER
Spoke with Karen Smith at [place] [address]  Reference #:    Time:05:53    Per Karen Smith Case is still pending as of 02/17, she will jabari case as Urgent. Clinical Information was received.

## 2020-02-13 ENCOUNTER — TELEPHONE (OUTPATIENT)
Dept: PAIN CLINIC | Facility: CLINIC | Age: 55
End: 2020-02-13

## 2020-02-13 NOTE — TELEPHONE ENCOUNTER
LM advising pt to contact [facility] to request that scripts are transferred from [facility]. Informed pt that [facility] will request on his behalf and will be a quicker process than if we changed the pharmacy.  Encouraged pt to contact office w/ any q

## 2020-02-13 NOTE — TELEPHONE ENCOUNTER
Pt returning a call. Pt states he works till 430pm today, if he does not answer it is ok to LV. or he will get back to us tomorrow.

## 2020-02-20 NOTE — TELEPHONE ENCOUNTER
Prior authorization request completed for: Lucho Haji #[id number]     Authorization dates: 02/19/20 -Need to call back to update DOS  CPT codes approved: 88228  Number of visits/dates of service approved: 1  Physician: Julian Santiago     Patient can be sche

## 2020-02-28 DIAGNOSIS — M54.12 CERVICAL RADICULOPATHY: ICD-10-CM

## 2020-02-28 DIAGNOSIS — G89.29 CHRONIC BACK PAIN GREATER THAN 3 MONTHS DURATION: ICD-10-CM

## 2020-02-28 DIAGNOSIS — M47.812 ARTHRITIS OF NECK: Primary | ICD-10-CM

## 2020-02-28 DIAGNOSIS — M54.9 CHRONIC BACK PAIN GREATER THAN 3 MONTHS DURATION: ICD-10-CM

## 2020-02-28 RX ORDER — HYDROCODONE BITARTRATE AND ACETAMINOPHEN 5; 325 MG/1; MG/1
1 TABLET ORAL EVERY 12 HOURS PRN
Qty: 60 TABLET | Refills: 0 | Status: SHIPPED | OUTPATIENT
Start: 2020-02-28 | End: 2020-04-08

## 2020-02-28 NOTE — TELEPHONE ENCOUNTER
Pt asking if he can p/u script on Monday 3/2/2020 before checking in for his ISSA.       Medication: HYDROcodone-acetaminophen 5-325 MG Oral Tab    Date of last refill: 1/29/2020  [place] 2/70/3291 MF verified    Last off Patient verbalizes understanding. 4) Start gabapentin 300mg TID. Take 1st dose at night to see if there is any sedating effects prior to taking during the day. 5) Start methocarbamol 500mg TID PRN.   6) F/U in clinic 2-4 weeks post procedure for re-evalu

## 2020-02-28 NOTE — TELEPHONE ENCOUNTER
Spoke to pt to schedule ISSA for 3/2/2020, case time 1545. Pt states he prefers sedation. Reviewed pre-procedure instructions including NPO status, need for , and check in location. Pt verbalized understanding.  Pt asking if he can  script for

## 2020-02-28 NOTE — TELEPHONE ENCOUNTER
Spoke with Joshua Cox at [place] [address]  Reference #:[id number]    CXJN:29:52    Updated DOS to 03/02/20

## 2020-03-02 ENCOUNTER — HOSPITAL ENCOUNTER (OUTPATIENT)
Facility: HOSPITAL | Age: 55
Setting detail: HOSPITAL OUTPATIENT SURGERY
Discharge: HOME | End: 2020-03-02
Attending: ANESTHESIOLOGY | Admitting: ANESTHESIOLOGY
Payer: COMMERCIAL

## 2020-03-02 ENCOUNTER — APPOINTMENT (OUTPATIENT)
Dept: GENERAL RADIOLOGY | Facility: HOSPITAL | Age: 55
End: 2020-03-02
Attending: ANESTHESIOLOGY
Payer: COMMERCIAL

## 2020-03-02 VITALS
DIASTOLIC BLOOD PRESSURE: 91 MMHG | HEART RATE: 76 BPM | OXYGEN SATURATION: 98 % | RESPIRATION RATE: 18 BRPM | TEMPERATURE: 99 F | SYSTOLIC BLOOD PRESSURE: 161 MMHG

## 2020-03-02 DIAGNOSIS — M54.12 CERVICAL RADICULITIS: ICD-10-CM

## 2020-03-02 PROCEDURE — B01B1ZZ FLUOROSCOPY OF SPINAL CORD USING LOW OSMOLAR CONTRAST: ICD-10-PCS | Performed by: ANESTHESIOLOGY

## 2020-03-02 PROCEDURE — 3E0R33Z INTRODUCTION OF ANTI-INFLAMMATORY INTO SPINAL CANAL, PERCUTANEOUS APPROACH: ICD-10-PCS | Performed by: ANESTHESIOLOGY

## 2020-03-02 RX ORDER — ONDANSETRON 2 MG/ML
4 INJECTION INTRAMUSCULAR; INTRAVENOUS ONCE AS NEEDED
Status: DISCONTINUED | OUTPATIENT
Start: 2020-03-02 | End: 2020-03-02

## 2020-03-02 RX ORDER — DEXAMETHASONE SODIUM PHOSPHATE 10 MG/ML
INJECTION, SOLUTION INTRAMUSCULAR; INTRAVENOUS AS NEEDED
Status: DISCONTINUED | OUTPATIENT
Start: 2020-03-02 | End: 2020-03-02

## 2020-03-02 RX ORDER — SODIUM CHLORIDE, SODIUM LACTATE, POTASSIUM CHLORIDE, CALCIUM CHLORIDE 600; 310; 30; 20 MG/100ML; MG/100ML; MG/100ML; MG/100ML
100 INJECTION, SOLUTION INTRAVENOUS CONTINUOUS
Status: DISCONTINUED | OUTPATIENT
Start: 2020-03-02 | End: 2020-03-02

## 2020-03-02 RX ORDER — DIPHENHYDRAMINE HYDROCHLORIDE 50 MG/ML
50 INJECTION INTRAMUSCULAR; INTRAVENOUS ONCE AS NEEDED
Status: DISCONTINUED | OUTPATIENT
Start: 2020-03-02 | End: 2020-03-02

## 2020-03-02 RX ORDER — 0.9 % SODIUM CHLORIDE 0.9 %
VIAL (ML) INJECTION AS NEEDED
Status: DISCONTINUED | OUTPATIENT
Start: 2020-03-02 | End: 2020-03-02

## 2020-03-02 RX ORDER — LIDOCAINE HYDROCHLORIDE 10 MG/ML
INJECTION, SOLUTION EPIDURAL; INFILTRATION; INTRACAUDAL; PERINEURAL AS NEEDED
Status: DISCONTINUED | OUTPATIENT
Start: 2020-03-02 | End: 2020-03-02

## 2020-03-02 NOTE — H&P
History & Physical Examination    Patient Name: Kacey Hemphill  MRN: [id number]  CSN: [id number]  YOB: 1965    Pre-Operative Diagnosis:  Cervical radiculitis [M54.12]    Present Illness: Patient with cervical radiculopathy for epidural steroi HEENT [x ] [x ]    NECK & BACK [x ] [x ]    HEART [x ] [x ]    LUNGS [x ] [x ]    ABDOMEN [x ] [x ]    UROGENITAL [x ] [x ]    EXTREMITIES [x ] [x ]    OTHER        [ x ] I have discussed the risks and benefits and alternatives with the patient/family.

## 2020-03-02 NOTE — OPERATIVE REPORT
[facility]  Operative Report  3/2/2020     Len Narayan Patient Status:  Hospital Outpatient Surgery    1965 MRN [id number]   [facility] ENDOSCOPY Attending Sophia Hernandez MD   Hosp Day # 0 PCP Chloe Celeste DO     Indication: Dennise Lindquist recovered and was discharged to a responsible adult after discharge criteria were met. Complications: None. Follow up: The patient was followed in the pain clinic as needed basis.           Skyler Aguayo MD

## 2020-03-11 RX ORDER — GABAPENTIN 300 MG/1
300 CAPSULE ORAL 3 TIMES DAILY
Qty: 90 CAPSULE | Refills: 2 | Status: SHIPPED | OUTPATIENT
Start: 2020-03-11 | End: 2020-03-16

## 2020-03-11 RX ORDER — METHOCARBAMOL 500 MG/1
500 TABLET, FILM COATED ORAL 3 TIMES DAILY PRN
Qty: 90 TABLET | Refills: 0 | Status: SHIPPED | OUTPATIENT
Start: 2020-03-11 | End: 2020-03-16

## 2020-03-11 NOTE — TELEPHONE ENCOUNTER
Medication(s):     methocarbamol 500 MG Oral Tab  Date of last refill: 02/06/20  [place]      gabapentin 300 MG Oral Cap  Date of last refill: 02/06/20  [place]      Last offic narcotics. Patient verbalizes understanding. 4) Start gabapentin 300mg TID. Take 1st dose at night to see if there is any sedating effects prior to taking during the day. 5) Start methocarbamol 500mg TID PRN.   6) F/U in clinic 2-4 weeks post procedure f

## 2020-03-11 NOTE — TELEPHONE ENCOUNTER
Pt req metho carbamol 500 MG Oral Tab &    gabapentin 300 MG Oral Cap    Patient informed of 48 hour refill policy excluding weekends and holidays. Informed patient only patient can  the prescription effective April 1, 2017.   Further explained nirali

## 2020-03-16 RX ORDER — GABAPENTIN 300 MG/1
CAPSULE ORAL
Qty: 90 CAPSULE | Refills: 2 | Status: SHIPPED | OUTPATIENT
Start: 2020-03-16 | End: 2020-04-08 | Stop reason: DRUGHIGH

## 2020-03-16 RX ORDER — METHOCARBAMOL 500 MG/1
TABLET, FILM COATED ORAL
Qty: 90 TABLET | Refills: 0 | Status: SHIPPED | OUTPATIENT
Start: 2020-03-16 | End: 2020-12-31

## 2020-03-16 NOTE — TELEPHONE ENCOUNTER
Medication(s):       methocarbamol 500 MG Oral Tab  Date of last refill: 03/11/20  [place]      gabapentin 300 MG Oral Cap  Date of last refill: 03/11/20  [place]      Last off taking narcotics. Patient verbalizes understanding. 4) Start gabapentin 300mg TID. Take 1st dose at night to see if there is any sedating effects prior to taking during the day. 5) Start methocarbamol 500mg TID PRN.   6) F/U in clinic 2-4 weeks post proc

## 2020-03-20 ENCOUNTER — TELEPHONE (OUTPATIENT)
Dept: PAIN CLINIC | Facility: CLINIC | Age: 55
End: 2020-03-20

## 2020-03-20 NOTE — TELEPHONE ENCOUNTER
LMTCB    Due to current health concerns, patients upcoming appointment is to be postponed until 5/1 or after.

## 2020-03-27 ENCOUNTER — TELEPHONE (OUTPATIENT)
Dept: FAMILY MEDICINE CLINIC | Facility: CLINIC | Age: 55
End: 2020-03-27

## 2020-03-27 NOTE — TELEPHONE ENCOUNTER
Spoke to pt about sample he is looking for, that we do not have any at this time. Pt questioned how to go about getting a prescription for this medication.  I informed him we are doing telemed visits and he could speak with a doctor to vent his questions an

## 2020-03-30 ENCOUNTER — TELEPHONE (OUTPATIENT)
Dept: SURGERY | Facility: CLINIC | Age: 55
End: 2020-03-30

## 2020-03-30 DIAGNOSIS — G89.29 CHRONIC BACK PAIN GREATER THAN 3 MONTHS DURATION: ICD-10-CM

## 2020-03-30 DIAGNOSIS — M54.12 CERVICAL RADICULOPATHY: ICD-10-CM

## 2020-03-30 DIAGNOSIS — M47.812 ARTHRITIS OF NECK: ICD-10-CM

## 2020-03-30 DIAGNOSIS — M54.9 CHRONIC BACK PAIN GREATER THAN 3 MONTHS DURATION: ICD-10-CM

## 2020-03-30 NOTE — TELEPHONE ENCOUNTER
Given his response with ETOH use, we can't continue to prescribe. I would be happy to resume once he is no longer drinking heavily.

## 2020-03-30 NOTE — TELEPHONE ENCOUNTER
I wanted to check with you regarding continuing Elizabeth Zac for this patient, he admits to daily large amount of alcohol and UDS from 1/20 was also positive for alcohol with hydrocodone. Alcohol use:  Yes        Alcohol/week: 64.0 standard drinks        Type

## 2020-04-02 NOTE — TELEPHONE ENCOUNTER
Spoke to pt to relay info below.  Pt states he takes his last dose of norco at lunch time each day and does not drink alcohol until 5PM. Advised pt that the narcotic is still in his system if he is taking it regularly, he would have to stop norco for at [place] [address]

## 2020-04-06 ENCOUNTER — TELEPHONE (OUTPATIENT)
Dept: FAMILY MEDICINE CLINIC | Facility: CLINIC | Age: 55
End: 2020-04-06

## 2020-04-06 NOTE — TELEPHONE ENCOUNTER
PATIENT TOOK OFF TODAY BECAUSE OF SYMPTOMS EXPERIENCED ON Saturday- FEVER, FATIGUE, RUNNY NOSE. HE ACTUALLY NEVER CHECKED HIS TEMPERATURE. SLEPT/RESTED Saturday AND Sunday. TODAY FEELING BETTER BUT %.    PATIENT THEN ASKS IF THIS COULD BE [place]

## 2020-04-08 RX ORDER — GABAPENTIN 400 MG/1
400 CAPSULE ORAL EVERY 8 HOURS
Qty: 90 CAPSULE | Refills: 0 | Status: SHIPPED | OUTPATIENT
Start: 2020-04-08 | End: 2020-05-08

## 2020-04-08 NOTE — TELEPHONE ENCOUNTER
Dr. Marilyn Zee has discussed with patient that given his alcohol use he is not able to prescribe Norco. Taking medication belonging to others is also very dangerous and against our controlled substance agreement.  We can treat him with nonopioid medications at th

## 2020-04-08 NOTE — TELEPHONE ENCOUNTER
Patient called today requesting to go back on Norco.  Is in a lot of pain and indicated that his veronica felt sorry for him and gave him a half of one of his Norco and it has made him feel a lot better.    Please call patient

## 2020-04-08 NOTE — TELEPHONE ENCOUNTER
I have left patient VM to increase gabapentin to 400mg every 8 hours.      Bello Cesar, can you please relay to the staff answering phone calls to transfer these calls to the nurse so that she can discuss these recommendations with the patients when they call b

## 2020-04-09 ENCOUNTER — TELEPHONE (OUTPATIENT)
Dept: FAMILY MEDICINE CLINIC | Facility: CLINIC | Age: 55
End: 2020-04-09

## 2020-04-09 NOTE — TELEPHONE ENCOUNTER
BECAUSE HIS GIRLFRIEND IS HOME SICK; HIS WORK SENT HIM HOME FROM WORK. SHE JUST MOVED BACK FROM [place]; SHE HAS NO DOCTOR. LOST HER VOICE, RUNNY NOSE, FATIGUE, HEADACHE, NO TEMP OR SHORTNESS OF BREATH. Victoriano Lechuga   REVIEWED SYMPTOMATIC TREATMENT- REST, FLUIDS, HO

## 2020-04-21 ENCOUNTER — TELEPHONE (OUTPATIENT)
Dept: SURGERY | Facility: CLINIC | Age: 55
End: 2020-04-21

## 2020-04-21 NOTE — TELEPHONE ENCOUNTER
patient pain is getting worse  methocarbonohl is not working  wondering if I can get script for Tylenol 3.   Then methocarbanohl does nothing but make me drowsy  Please leave message as patient is at work

## 2020-04-21 NOTE — TELEPHONE ENCOUNTER
See TE from 3/30/2020. Pt to be treated w/ nonopioid medications going forward d/t alcohol use and taking meds belonging to others.

## 2020-04-21 NOTE — TELEPHONE ENCOUNTER
LM (ok per verbal release) relaying all info below including recommendation that pt return to NS if condition is worsening. Maranda Burnette MD 30 minutes ago (1:41 PM)      Pt currently taking gabapentin 400 mg TID, started on 4/8/2020.  Can this be a

## 2020-04-21 NOTE — TELEPHONE ENCOUNTER
Patient needs surgery for definitive care. I would not recommend tylenol #3 as it is also a narcotic.

## 2020-04-23 ENCOUNTER — TELEPHONE (OUTPATIENT)
Dept: SURGERY | Facility: CLINIC | Age: 55
End: 2020-04-23

## 2020-04-23 NOTE — TELEPHONE ENCOUNTER
KEYONA ALMENDAREZ RELEASE for HIPPA, number is incorrect. Called and was advised of wrong #. Please update when discuss with patient. Left VM. Advised to please call back.     Patient should be seen for in office visit if possible and he is comfortable with this d

## 2020-04-24 NOTE — TELEPHONE ENCOUNTER
Pt called back, phone number updated to correct # of [phone].  Pt agreed to in-office appt on 04/29, scheduled for 4pm

## 2020-04-27 ENCOUNTER — TELEPHONE (OUTPATIENT)
Dept: PAIN CLINIC | Facility: CLINIC | Age: 55
End: 2020-04-27

## 2020-04-27 NOTE — TELEPHONE ENCOUNTER
LMTCB    Due to current Covid concern, provider is not seeing patient's in office, patient's 5/4 med review visit to be converted to phone visit with Patrick Hernández.

## 2020-07-15 ENCOUNTER — OFFICE VISIT (OUTPATIENT)
Dept: FAMILY MEDICINE CLINIC | Facility: CLINIC | Age: 55
End: 2020-07-15
Payer: COMMERCIAL

## 2020-07-15 VITALS
RESPIRATION RATE: 18 BRPM | DIASTOLIC BLOOD PRESSURE: 80 MMHG | BODY MASS INDEX: 20.66 KG/M2 | TEMPERATURE: 99 F | OXYGEN SATURATION: 98 % | HEART RATE: 78 BPM | WEIGHT: 122.5 LBS | HEIGHT: 64.5 IN | SYSTOLIC BLOOD PRESSURE: 140 MMHG

## 2020-07-15 DIAGNOSIS — M21.612 BUNION OF GREAT TOE OF LEFT FOOT: Primary | ICD-10-CM

## 2020-07-15 DIAGNOSIS — F41.8 ANXIETY WITH DEPRESSION: ICD-10-CM

## 2020-07-15 DIAGNOSIS — G57.62 MORTON'S NEUROMA OF LEFT FOOT: ICD-10-CM

## 2020-07-15 DIAGNOSIS — M79.672 LEFT FOOT PAIN: ICD-10-CM

## 2020-07-15 PROCEDURE — 99214 OFFICE O/P EST MOD 30 MIN: CPT | Performed by: FAMILY MEDICINE

## 2020-07-15 PROCEDURE — 3077F SYST BP >= 140 MM HG: CPT | Performed by: FAMILY MEDICINE

## 2020-07-15 PROCEDURE — 3079F DIAST BP 80-89 MM HG: CPT | Performed by: FAMILY MEDICINE

## 2020-07-15 PROCEDURE — 3008F BODY MASS INDEX DOCD: CPT | Performed by: FAMILY MEDICINE

## 2020-07-15 PROCEDURE — 20550 NJX 1 TENDON SHEATH/LIGAMENT: CPT | Performed by: FAMILY MEDICINE

## 2020-07-15 RX ORDER — TRIAMCINOLONE ACETONIDE 40 MG/ML
20 INJECTION, SUSPENSION INTRA-ARTICULAR; INTRAMUSCULAR ONCE
Status: COMPLETED | OUTPATIENT
Start: 2020-07-15 | End: 2020-07-15

## 2020-07-15 RX ADMIN — TRIAMCINOLONE ACETONIDE 20 MG: 40 INJECTION, SUSPENSION INTRA-ARTICULAR; INTRAMUSCULAR at 14:39:00

## 2020-07-15 NOTE — PROGRESS NOTES
HPI:    Patient ID: Henok Pierce is a 54year old male. X 3 wks pain plantar L foot - ? Boots caused  Pain discomfort to plantar aspect distal left foot. History of similar pain in the past.  Saw podiatry and will schedule for bunionectomy.   Had injec 64.5\"   Wt 122 lb 8 oz (55.6 kg)   SpO2 98%   BMI 20.70 kg/m²              ASSESSMENT/PLAN:   Left foot pain  Bunion of great toe of left foot  (primary encounter diagnosis)  Bermudez's neuroma of left foot  Anxiety with depression    No orders of the defin

## 2020-10-09 ENCOUNTER — OFFICE VISIT (OUTPATIENT)
Dept: PAIN CLINIC | Facility: CLINIC | Age: 55
End: 2020-10-09
Payer: COMMERCIAL

## 2020-10-09 ENCOUNTER — TELEPHONE (OUTPATIENT)
Dept: PAIN CLINIC | Facility: CLINIC | Age: 55
End: 2020-10-09

## 2020-10-09 VITALS
HEIGHT: 65 IN | DIASTOLIC BLOOD PRESSURE: 82 MMHG | HEART RATE: 95 BPM | WEIGHT: 128 LBS | OXYGEN SATURATION: 96 % | BODY MASS INDEX: 21.33 KG/M2 | SYSTOLIC BLOOD PRESSURE: 132 MMHG

## 2020-10-09 DIAGNOSIS — M47.812 ARTHRITIS OF NECK: Primary | ICD-10-CM

## 2020-10-09 DIAGNOSIS — M54.2 NECK PAIN: ICD-10-CM

## 2020-10-09 PROCEDURE — 3075F SYST BP GE 130 - 139MM HG: CPT | Performed by: ANESTHESIOLOGY

## 2020-10-09 PROCEDURE — 99213 OFFICE O/P EST LOW 20 MIN: CPT | Performed by: ANESTHESIOLOGY

## 2020-10-09 PROCEDURE — 3079F DIAST BP 80-89 MM HG: CPT | Performed by: ANESTHESIOLOGY

## 2020-10-09 PROCEDURE — 3008F BODY MASS INDEX DOCD: CPT | Performed by: ANESTHESIOLOGY

## 2020-10-09 RX ORDER — LORAZEPAM 2 MG/1
TABLET ORAL
COMMUNITY
Start: 2020-09-09 | End: 2020-12-31

## 2020-10-09 RX ORDER — HYDROCODONE BITARTRATE AND ACETAMINOPHEN 5; 325 MG/1; MG/1
1 TABLET ORAL 2 TIMES DAILY
COMMUNITY
Start: 2020-08-22

## 2020-10-09 RX ORDER — SILDENAFIL CITRATE 20 MG/1
TABLET ORAL AS NEEDED
COMMUNITY
Start: 2020-05-04

## 2020-10-09 RX ORDER — CLINDAMYCIN HYDROCHLORIDE 150 MG/1
150 CAPSULE ORAL 3 TIMES DAILY
COMMUNITY
Start: 2020-09-10 | End: 2020-12-31

## 2020-10-09 RX ORDER — CHLORHEXIDINE GLUCONATE 0.12 MG/ML
RINSE ORAL
COMMUNITY
Start: 2020-09-10

## 2020-10-09 RX ORDER — IBUPROFEN 800 MG/1
800 TABLET ORAL EVERY 8 HOURS PRN
COMMUNITY
Start: 2020-09-10

## 2020-10-09 RX ORDER — HYDROCODONE BITARTRATE AND ACETAMINOPHEN 5; 325 MG/1; MG/1
1 TABLET ORAL
COMMUNITY
End: 2020-10-09

## 2020-10-09 NOTE — PROGRESS NOTES
Patient presents in office today with reported pain in neck, shoulders, thoracic back    Current pain level reported = 7/10    Last reported dose of HYDROcodone-acetaminophen 5-325 MG Oral Tab pt states in the AM day of OV

## 2020-10-09 NOTE — PROGRESS NOTES
Name: Stevie Max   : 1965   DOS: 10/9/2020     Pain Clinic Follow Up Visit:   Patient presents with:  [facility] F/U      Stevie Max is a 54year old male with a history of cervical degenerative disc disease and significant cervical BMI 21.30 kg/m²   General:  Patient is a(n) 54year old year old male in no acute distress. Neurologic[de-identified] WNL-Orientation to time, place and person, normal mood & affect, concentration & attention span intact.    Inspection:  Ambulates with well-coordinated

## 2020-10-09 NOTE — TELEPHONE ENCOUNTER
Medical clearance needed- No    Implanted cardiac device/SCS/PNS: NA    Pt seen in OV today by Dr. Theresa العراقي and recommended for ISSA (X 1). Please begin PA process for procedure(s).      Laterality: NA  Level(s): NA    Pt informed callback will be given when P

## 2020-10-12 NOTE — TELEPHONE ENCOUNTER
Prior authorization request completed for: [address] #[id number]     Authorization dates: 10/13/20 - 01/11/21  CPT codes approved: 24153  Number of visits/dates of service approved: 1  Physician: To Mcghee     Patient can be scheduled

## 2020-10-15 NOTE — TELEPHONE ENCOUNTER
Patient advised of insurance approval to proceed with injections and is agreeable to scheduling. Patient scheduled for procedure, pre-procedure instructions reviewed. Patient prefers local sedation.  Reviewed sedation instructions including no need to be fa Number of days you need to be off for the following medications:  ? Aggrenox 10 days   ? Agrylin (Anagrelide) 10 days  ? Brilinta (Ticagrelor) 7 days  ? Imbruvica (Ibrutinib) 3 days   ? Enbrel (Etanercept) 24 hours   ? Fragmin (Dalteparin) 24 hours   ?  Pl Please call our office with any questions or concerns before or after your procedure at  [phone].   If you are a diabetic, please increase the frequency of your glucose monitoring after the procedure as this may cause a temporary increase in your blood

## 2020-10-20 ENCOUNTER — APPOINTMENT (OUTPATIENT)
Dept: GENERAL RADIOLOGY | Facility: HOSPITAL | Age: 55
End: 2020-10-20
Attending: ANESTHESIOLOGY
Payer: COMMERCIAL

## 2020-10-20 ENCOUNTER — HOSPITAL ENCOUNTER (OUTPATIENT)
Facility: HOSPITAL | Age: 55
Setting detail: HOSPITAL OUTPATIENT SURGERY
Discharge: HOME | End: 2020-10-20
Attending: ANESTHESIOLOGY | Admitting: ANESTHESIOLOGY
Payer: COMMERCIAL

## 2020-10-20 VITALS
DIASTOLIC BLOOD PRESSURE: 95 MMHG | HEART RATE: 89 BPM | WEIGHT: 128 LBS | BODY MASS INDEX: 21 KG/M2 | SYSTOLIC BLOOD PRESSURE: 159 MMHG | OXYGEN SATURATION: 100 % | TEMPERATURE: 99 F | RESPIRATION RATE: 18 BRPM

## 2020-10-20 DIAGNOSIS — M54.2 NECK PAIN: ICD-10-CM

## 2020-10-20 DIAGNOSIS — M47.812 ARTHRITIS OF NECK: ICD-10-CM

## 2020-10-20 PROCEDURE — 3E0S33Z INTRODUCTION OF ANTI-INFLAMMATORY INTO EPIDURAL SPACE, PERCUTANEOUS APPROACH: ICD-10-PCS | Performed by: ANESTHESIOLOGY

## 2020-10-20 PROCEDURE — B01B1ZZ FLUOROSCOPY OF SPINAL CORD USING LOW OSMOLAR CONTRAST: ICD-10-PCS | Performed by: ANESTHESIOLOGY

## 2020-10-20 RX ORDER — LIDOCAINE HYDROCHLORIDE 10 MG/ML
INJECTION, SOLUTION EPIDURAL; INFILTRATION; INTRACAUDAL; PERINEURAL AS NEEDED
Status: DISCONTINUED | OUTPATIENT
Start: 2020-10-20 | End: 2020-10-20

## 2020-10-20 RX ORDER — SODIUM CHLORIDE, SODIUM LACTATE, POTASSIUM CHLORIDE, CALCIUM CHLORIDE 600; 310; 30; 20 MG/100ML; MG/100ML; MG/100ML; MG/100ML
100 INJECTION, SOLUTION INTRAVENOUS CONTINUOUS
Status: DISCONTINUED | OUTPATIENT
Start: 2020-10-20 | End: 2020-10-20

## 2020-10-20 RX ORDER — DEXAMETHASONE SODIUM PHOSPHATE 10 MG/ML
INJECTION, SOLUTION INTRAMUSCULAR; INTRAVENOUS AS NEEDED
Status: DISCONTINUED | OUTPATIENT
Start: 2020-10-20 | End: 2020-10-20

## 2020-10-20 RX ORDER — ONDANSETRON 2 MG/ML
4 INJECTION INTRAMUSCULAR; INTRAVENOUS ONCE AS NEEDED
Status: DISCONTINUED | OUTPATIENT
Start: 2020-10-20 | End: 2020-10-20

## 2020-10-20 RX ORDER — DIPHENHYDRAMINE HYDROCHLORIDE 50 MG/ML
50 INJECTION INTRAMUSCULAR; INTRAVENOUS ONCE AS NEEDED
Status: CANCELLED | OUTPATIENT
Start: 2020-10-20 | End: 2020-10-20

## 2020-10-20 RX ORDER — ONDANSETRON 2 MG/ML
4 INJECTION INTRAMUSCULAR; INTRAVENOUS ONCE AS NEEDED
Status: CANCELLED | OUTPATIENT
Start: 2020-10-20 | End: 2020-10-20

## 2020-10-20 RX ORDER — 0.9 % SODIUM CHLORIDE 0.9 %
VIAL (ML) INJECTION AS NEEDED
Status: DISCONTINUED | OUTPATIENT
Start: 2020-10-20 | End: 2020-10-20

## 2020-10-20 NOTE — OPERATIVE REPORT
[facility]  Operative Report  10/20/2020     Dada Gambino Patient Status:  Hospital Outpatient Surgery    1965 MRN [id number]   [facility] ENDOSCOPY Attending Riki Gambino MD   Hosp Day # 0 PCP Cat Huerta DO     Indication: R and recovered and was discharged to a responsible adult after discharge criteria were met. Complications: None. Follow up: The patient was followed in the pain clinic as needed basis.           Buddy Keen MD

## 2020-10-20 NOTE — H&P
History & Physical Examination    Patient Name: Claudette Mesa  MRN: [id number]  [facility]: [id number]  YOB: 1965    Pre-Operative Diagnosis:  Arthritis of neck [M47.812]  Neck pain [M54.2]    Present Illness: Patient with cervical radiculopathy fo History   Problem Relation Age of Onset   • Cancer Father         Esophagus     Social History    Tobacco Use      Smoking status: Current Every Day Smoker        Packs/day: 1.00        Years: 40.00        Pack years: 40        Types: Cigarettes      Smoke

## 2020-10-20 NOTE — OPERATIVE REPORT
[facility]  Operative Report  10/20/2020     Sierra Burnett Patient Status:  Hospital Outpatient Surgery    1965 MRN [id number]   [facility] ENDOSCOPY Attending Fernando Benson MD   Hosp Day # 0 PCP Anand Marie DO     Indication: R and recovered and was discharged to a responsible adult after discharge criteria were met. Complications: None. Follow up: The patient was followed in the pain clinic as needed basis.           Nirav Boggs MD

## 2020-12-31 ENCOUNTER — OFFICE VISIT (OUTPATIENT)
Dept: SURGERY | Facility: CLINIC | Age: 55
End: 2020-12-31
Payer: COMMERCIAL

## 2020-12-31 ENCOUNTER — HOSPITAL ENCOUNTER (OUTPATIENT)
Dept: GENERAL RADIOLOGY | Facility: HOSPITAL | Age: 55
Discharge: HOME | End: 2020-12-31
Attending: PHYSICIAN ASSISTANT
Payer: COMMERCIAL

## 2020-12-31 VITALS
SYSTOLIC BLOOD PRESSURE: 130 MMHG | BODY MASS INDEX: 21.33 KG/M2 | HEIGHT: 65 IN | WEIGHT: 128 LBS | DIASTOLIC BLOOD PRESSURE: 92 MMHG | HEART RATE: 88 BPM

## 2020-12-31 DIAGNOSIS — M48.02 MYELOPATHY CONCURRENT WITH AND DUE TO SPINAL STENOSIS OF CERVICAL REGION (HCC): ICD-10-CM

## 2020-12-31 DIAGNOSIS — G99.2 MYELOPATHY CONCURRENT WITH AND DUE TO SPINAL STENOSIS OF CERVICAL REGION (HCC): ICD-10-CM

## 2020-12-31 DIAGNOSIS — R29.898 WEAKNESS OF BOTH LOWER EXTREMITIES: ICD-10-CM

## 2020-12-31 DIAGNOSIS — M48.02 MYELOPATHY CONCURRENT WITH AND DUE TO SPINAL STENOSIS OF CERVICAL REGION (HCC): Primary | ICD-10-CM

## 2020-12-31 DIAGNOSIS — R29.898 WEAKNESS OF BOTH UPPER EXTREMITIES: ICD-10-CM

## 2020-12-31 DIAGNOSIS — G99.2 MYELOPATHY CONCURRENT WITH AND DUE TO SPINAL STENOSIS OF CERVICAL REGION (HCC): Primary | ICD-10-CM

## 2020-12-31 PROCEDURE — 3080F DIAST BP >= 90 MM HG: CPT | Performed by: PHYSICIAN ASSISTANT

## 2020-12-31 PROCEDURE — 99214 OFFICE O/P EST MOD 30 MIN: CPT | Performed by: PHYSICIAN ASSISTANT

## 2020-12-31 PROCEDURE — 3008F BODY MASS INDEX DOCD: CPT | Performed by: PHYSICIAN ASSISTANT

## 2020-12-31 PROCEDURE — 72050 X-RAY EXAM NECK SPINE 4/5VWS: CPT | Performed by: PHYSICIAN ASSISTANT

## 2020-12-31 PROCEDURE — 3075F SYST BP GE 130 - 139MM HG: CPT | Performed by: PHYSICIAN ASSISTANT

## 2020-12-31 RX ORDER — GABAPENTIN 300 MG/1
CAPSULE ORAL
COMMUNITY
End: 2020-12-31

## 2020-12-31 NOTE — PATIENT INSTRUCTIONS
Refill policies:    • Allow 2-3 business days for refills; controlled substances may take longer.   • Contact your pharmacy at least 5 days prior to running out of medication and have them send an electronic request or submit request through the “request re Depending on your insurance carrier, approval may take 3-10 days. It is highly recommended patients contact their insurance carrier directly to determine coverage.   If test is done without insurance authorization, patient may be responsible for the entire discussed  -need XR CS to determine levels  -Medical clearance, PCP to helped smoking cessation  -we can discuss via televisit after XR CS

## 2020-12-31 NOTE — PROGRESS NOTES
Patient: Valeriy Grajeda  Medical Record Number: [id number]  YOB: 1965  PCP: Mona Miller DO    HISTORY OF CHIEF COMPLAINT:    Valeriy Grajeda is a very pleasant 54year old male here in follow up. he has had 3 CSESI. Improvement for 3 days. not take daily Aspirin, blood thinners or have a pacemaker or defibrillator. History reviewed. No pertinent past medical history.    Past Surgical History:   Procedure Laterality Date   • CERVICAL EPIDURAL STEROID INJECTION N/A 10/20/2020    Performed b total) by mouth daily. 90 tablet 1   • ALPRAZolam 1 MG Oral Tab Take 1 tablet (1 mg total) by mouth 2 (two) times daily as needed for Anxiety (quantity to last 30 days). 30 tablet 0        REVIEW OF SYSTEMS   Comprehensive review of systems done.  Negative paracentral uncal osteophyte causing mild bilateral neural foraminal stenosis and mild spinal canal stenosis. C4-C5:  Posterior osteophyte causing moderate right neural foraminal stenosis with mild spinal canal stenosis.   C5-C6:  Posterior osteophyte is c

## 2020-12-31 NOTE — PROGRESS NOTES
Here to discuss sx for neck    Daily pain 8/10 can go up to 10/10 depends on what hes doing at work  When wakes up he is at a 10    Takes zquil to sleep  Shoulders, pain and arms and hands have n/t and loss of strength

## 2021-01-11 ENCOUNTER — TELEPHONE (OUTPATIENT)
Dept: PAIN CLINIC | Facility: CLINIC | Age: 56
End: 2021-01-11

## 2021-01-11 NOTE — TELEPHONE ENCOUNTER
Patient completed cervical xray on 12/31/2020. Patient needing to schedule televisit to discuss xray and discuss surgical plan. Per Era Serrano [facility] 12/31/2020: \"PLAN:   -he is a good surgical candidate consider ACDF C5-6 and C6-7.   Vs possible C3

## 2021-01-11 NOTE — TELEPHONE ENCOUNTER
Pt would like clarification on when his surgery is scheduled for as well as the details of the recovery process so he can inform his job. Please advise. Pt awaiting call back.

## 2021-02-25 ENCOUNTER — TELEMEDICINE (OUTPATIENT)
Dept: SURGERY | Facility: CLINIC | Age: 56
End: 2021-02-25
Payer: COMMERCIAL

## 2021-02-25 ENCOUNTER — TELEPHONE (OUTPATIENT)
Dept: SURGERY | Facility: CLINIC | Age: 56
End: 2021-02-25

## 2021-02-25 DIAGNOSIS — M48.02 MYELOPATHY CONCURRENT WITH AND DUE TO SPINAL STENOSIS OF CERVICAL REGION (HCC): Primary | ICD-10-CM

## 2021-02-25 DIAGNOSIS — G99.2 MYELOPATHY CONCURRENT WITH AND DUE TO SPINAL STENOSIS OF CERVICAL REGION (HCC): Primary | ICD-10-CM

## 2021-02-25 NOTE — TELEPHONE ENCOUNTER
At provider request called to inform patient the provider is having trouble trying to reach him via telemedicine video consult. Provider has attempted to call patient and was unable to reach him.     Attempted to reach patient via phone and was not able

## 2021-02-26 NOTE — TELEPHONE ENCOUNTER
S: Message noted regarding postponing surgery. B: Per LOV notes from Era Banda and Dr. Daniel Flowers on 12/31/20:    \"ASSESSMENT:  Cervical DDD with loss of lordosis and cervical most likely acquired kyphosis.  Moderate to severe C5-6 and C6-7 spinal

## 2021-02-26 NOTE — TELEPHONE ENCOUNTER
S: Received message regarding provider feedback. B:  Patient evaluated for cervical spine issues on 12/31/20 with JOSE R Joy.     A: Called patient who stated that:  -he is able to tolerate the pain and it has not \"gotten any worse\".    -fin

## 2021-02-26 NOTE — TELEPHONE ENCOUNTER
It is a hard question to answer since we have not seen him since December. Recommend he make a follow-up visit for strength assessment. If he is having a improvement with his strength then the answer would be no.   If he is having a decline in his strengt

## 2021-02-26 NOTE — TELEPHONE ENCOUNTER
pt returning mothers call, updated his number, reflected under demographics. asks that we do not contact Paola. pt does not want to move forward w having surgery due to financial/personal issues.  Pt wants to know if delaying sx will hurt him further, state

## 2021-03-01 NOTE — PROGRESS NOTES
Neurosurgery Attending        Patient was not connected to video at time of appointment. Patient was not answering phone number listed in his demographics sheet.     I asked the nursing staff to call for contact and they tried both his number and the numbe

## 2021-03-05 ENCOUNTER — OFFICE VISIT (OUTPATIENT)
Dept: SURGERY | Facility: CLINIC | Age: 56
End: 2021-03-05
Payer: COMMERCIAL

## 2021-03-05 VITALS
HEART RATE: 80 BPM | DIASTOLIC BLOOD PRESSURE: 80 MMHG | WEIGHT: 130 LBS | BODY MASS INDEX: 21.4 KG/M2 | SYSTOLIC BLOOD PRESSURE: 140 MMHG | HEIGHT: 65.5 IN

## 2021-03-05 DIAGNOSIS — M48.02 MYELOPATHY CONCURRENT WITH AND DUE TO SPINAL STENOSIS OF CERVICAL REGION (HCC): Primary | ICD-10-CM

## 2021-03-05 DIAGNOSIS — G99.2 MYELOPATHY CONCURRENT WITH AND DUE TO SPINAL STENOSIS OF CERVICAL REGION (HCC): Primary | ICD-10-CM

## 2021-03-05 DIAGNOSIS — M50.30 DDD (DEGENERATIVE DISC DISEASE), CERVICAL: ICD-10-CM

## 2021-03-05 PROCEDURE — 3079F DIAST BP 80-89 MM HG: CPT | Performed by: PHYSICIAN ASSISTANT

## 2021-03-05 PROCEDURE — 99213 OFFICE O/P EST LOW 20 MIN: CPT | Performed by: PHYSICIAN ASSISTANT

## 2021-03-05 PROCEDURE — 3077F SYST BP >= 140 MM HG: CPT | Performed by: PHYSICIAN ASSISTANT

## 2021-03-05 PROCEDURE — 3008F BODY MASS INDEX DOCD: CPT | Performed by: PHYSICIAN ASSISTANT

## 2021-03-05 NOTE — PROGRESS NOTES
Patient: Aliza Byrd  Medical Record Number: [id number]  YOB: 1965  PCP: Jamil Edouard, DO    HISTORY OF CHIEF COMPLAINT:    Aliza Byrd is a very pleasant 54year old male here in follow up for strength evaluation.   He denies any neck numbness/tingling and weakness where his arms become very heavy. No leg pain or numbness/tingling. No leg weakness. Able to climb stairs. No bladder/bowel incontinence/retention. Good balance. No trips or falls.      Patient is currently taking Carjimbo Dine No       Penicillin G            UNKNOWN  Penicillins             UNKNOWN   Current Medications:  Current Outpatient Medications   Medication Sig Dispense Refill   • HYDROcodone-acetaminophen 5-325 MG Oral Tab Take 1 tablet by mouth 2 (two) times daily. (POS or NEG) Left   (POS or NEG)   Spurling's Maneuver ?         nrg ?                Neg   Vazquez's Sign Neg Neg   Tromner's Sign     Lhermitte's neg    Clonus Neg Neg     DATA:   None    IMAGING:   PROCEDURE:  MRI SPINE CERVICAL (CPT=72141)  CERVICAL D

## 2021-03-05 NOTE — PROGRESS NOTES
Pt here for FU strength test    Pain 7/10 with pain medication     Pain is 10/10 when he wakes up in the morning and hasn't taken is medication yet. Pain in bilateral shoulders stabbing sharp pain, affecting his ability to move his arms.

## 2021-03-05 NOTE — PATIENT INSTRUCTIONS
Refill policies:    • Allow 2-3 business days for refills; controlled substances may take longer.   • Contact your pharmacy at least 5 days prior to running out of medication and have them send an electronic request or submit request through the “request re Depending on your insurance carrier, approval may take 3-10 days. It is highly recommended patients contact their insurance carrier directly to determine coverage.   If test is done without insurance authorization, patient may be responsible for the entire discussed  -Currently has improvement with strength. He feels like he can live with the shoulder pain.   Will monitor his symptoms I will see him back and 6 months for strength evaluation or if he is having a decline we will see him back sooner

## 2022-03-31 NOTE — TELEPHONE ENCOUNTER
Refill norco      Patient informed of 48 hour refill policy excluding weekends and holidays. Informed patient only patient can  the prescription effective April 1, 2017.   Further explained patient will not receive a call back once prescription is re J-Code:

## 2022-11-14 NOTE — TELEPHONE ENCOUNTER
ANTI DEPRESSANTS HE IS ON NOW ARE NOT HELPING HIM, WANTS TO GO BACK ON CELEXA, LEAVE DETAILED MESSAGE IF YOU GET HIS VOICEMAIL [Alert] : alert [No Acute Distress] : in no acute distress [Sclera] : the sclera and conjunctiva were normal [PERRL] : pupils were equal in size, round, and reactive to light [No Oral Pallor] : no oral pallor [No Oral Cyanosis] : no oral cyanosis [Normal Oral Mucosa] : normal oral mucosa [No Respiratory Distress] : no respiratory distress [No Acc Muscle Use] : no accessory muscle use [Respiration, Rhythm And Depth] : normal respiratory rhythm and effort [Edema] : edema was not present [No Focal Deficits] : no focal deficits [Normal Affect] : the affect was normal [Normal Mood] : the mood was normal [de-identified] : answering appropriately  [de-identified] : unable to assess  [de-identified] : left shin laceration ~10x7x0.2cm, serosanguineous drainage

## 2023-03-15 ENCOUNTER — PATIENT OUTREACH (OUTPATIENT)
Dept: FAMILY MEDICINE CLINIC | Facility: CLINIC | Age: 58
End: 2023-03-15

## 2023-07-31 NOTE — TELEPHONE ENCOUNTER
pt. called the office stating he really wanted to switch his PCP to Dr. Reno Shipley, I explained that he is not accepting NP only on a case by case basis because his practice is closed to NP.  I explained the other physicians accepting NP but he would still l ,DirectAddress_Unknown

## 2023-11-22 ENCOUNTER — PATIENT OUTREACH (OUTPATIENT)
Dept: CASE MANAGEMENT | Age: 58
End: 2023-11-22

## 2023-11-22 NOTE — PROCEDURES
The office order for PCP removal request is Approved and finalized on November 22, 2023.     Thanks,  [facility] [employer]

## 2023-11-30 ENCOUNTER — OFFICE VISIT (OUTPATIENT)
Dept: FAMILY MEDICINE CLINIC | Facility: CLINIC | Age: 58
End: 2023-11-30
Payer: COMMERCIAL

## 2023-11-30 VITALS
SYSTOLIC BLOOD PRESSURE: 144 MMHG | TEMPERATURE: 98 F | OXYGEN SATURATION: 97 % | RESPIRATION RATE: 18 BRPM | DIASTOLIC BLOOD PRESSURE: 86 MMHG | WEIGHT: 130 LBS | HEART RATE: 89 BPM | HEIGHT: 65 IN | BODY MASS INDEX: 21.66 KG/M2

## 2023-11-30 DIAGNOSIS — L03.116 CELLULITIS OF LEFT LOWER EXTREMITY: Primary | ICD-10-CM

## 2023-11-30 PROCEDURE — 99213 OFFICE O/P EST LOW 20 MIN: CPT | Performed by: PHYSICIAN ASSISTANT

## 2023-11-30 PROCEDURE — 3079F DIAST BP 80-89 MM HG: CPT | Performed by: PHYSICIAN ASSISTANT

## 2023-11-30 PROCEDURE — 3077F SYST BP >= 140 MM HG: CPT | Performed by: PHYSICIAN ASSISTANT

## 2023-11-30 PROCEDURE — 3008F BODY MASS INDEX DOCD: CPT | Performed by: PHYSICIAN ASSISTANT

## 2023-11-30 RX ORDER — CLINDAMYCIN HYDROCHLORIDE 300 MG/1
300 CAPSULE ORAL 3 TIMES DAILY
Qty: 30 CAPSULE | Refills: 0 | Status: SHIPPED | OUTPATIENT
Start: 2023-11-30 | End: 2023-12-10

## 2023-11-30 RX ORDER — ALPRAZOLAM 0.5 MG/1
0.5 TABLET ORAL EVERY MORNING
COMMUNITY

## 2025-07-03 ENCOUNTER — HOSPITAL ENCOUNTER (OUTPATIENT)
Age: 60
Discharge: HOME | End: 2025-07-03
Payer: COMMERCIAL

## 2025-07-03 ENCOUNTER — APPOINTMENT (OUTPATIENT)
Dept: CT IMAGING | Age: 60
End: 2025-07-03
Attending: NURSE PRACTITIONER
Payer: COMMERCIAL

## 2025-07-03 VITALS
RESPIRATION RATE: 16 BRPM | SYSTOLIC BLOOD PRESSURE: 131 MMHG | OXYGEN SATURATION: 96 % | WEIGHT: 125 LBS | DIASTOLIC BLOOD PRESSURE: 82 MMHG | BODY MASS INDEX: 20.83 KG/M2 | HEIGHT: 65 IN | HEART RATE: 87 BPM | TEMPERATURE: 98 F

## 2025-07-03 DIAGNOSIS — M54.9 BACK PAIN WITHOUT RADIATION: Primary | ICD-10-CM

## 2025-07-03 LAB
#MXD IC: 0.6 X10ˆ3/UL (ref 0.1–1)
BASOPHILS # BLD AUTO: 0.04 X10(3) UL (ref 0–0.2)
BASOPHILS NFR BLD AUTO: 0.5 %
BILIRUB UR QL STRIP: NEGATIVE
BUN BLD-MCNC: 5 MG/DL (ref 7–18)
CHLORIDE BLD-SCNC: 99 MMOL/L (ref 98–112)
CLARITY UR: CLEAR
CO2 BLD-SCNC: 23 MMOL/L (ref 21–32)
COLOR UR: YELLOW
CREAT BLD-MCNC: 0.8 MG/DL (ref 0.7–1.3)
EGFRCR SERPLBLD CKD-EPI 2021: 101 ML/MIN/1.73M2 (ref 60–?)
EOSINOPHIL # BLD AUTO: 0.12 X10(3) UL (ref 0–0.7)
EOSINOPHIL NFR BLD AUTO: 1.6 %
ERYTHROCYTE [DISTWIDTH] IN BLOOD BY AUTOMATED COUNT: 11.7 %
GLUCOSE BLD-MCNC: 81 MG/DL (ref 70–99)
GLUCOSE UR STRIP-MCNC: NEGATIVE MG/DL
HCT VFR BLD AUTO: 40.8 % (ref 39–53)
HCT VFR BLD AUTO: 43.5 % (ref 39–53)
HCT VFR BLD CALC: 44 % (ref 37–53)
HGB BLD-MCNC: 14.6 G/DL (ref 13–17.5)
HGB BLD-MCNC: 14.7 G/DL (ref 13–17.5)
HGB UR QL STRIP: NEGATIVE
IMM GRANULOCYTES # BLD AUTO: 0.02 X10(3) UL (ref 0–1)
IMM GRANULOCYTES NFR BLD: 0.3 %
ISTAT IONIZED CALCIUM FOR CHEM 8: 1.16 MMOL/L (ref 1.12–1.32)
KETONES UR STRIP-MCNC: NEGATIVE MG/DL
LEUKOCYTE ESTERASE UR QL STRIP: NEGATIVE
LYMPHOCYTES # BLD AUTO: 2.49 X10(3) UL (ref 1–4)
LYMPHOCYTES # BLD AUTO: 2.8 X10ˆ3/UL (ref 1–4)
LYMPHOCYTES NFR BLD AUTO: 33.1 %
LYMPHOCYTES NFR BLD AUTO: 34 %
MCH RBC QN AUTO: 32.5 PG (ref 26–34)
MCH RBC QN AUTO: 33.6 PG (ref 26–34)
MCHC RBC AUTO-ENTMCNC: 33.6 G/DL (ref 31–37)
MCHC RBC AUTO-ENTMCNC: 36 G/DL (ref 31–37)
MCV RBC AUTO: 93.4 FL (ref 80–100)
MCV RBC AUTO: 96.9 FL (ref 80–100)
MIXED CELL %: 7.4 %
MONOCYTES # BLD AUTO: 0.66 X10(3) UL (ref 0.1–1)
MONOCYTES NFR BLD AUTO: 8.8 %
NEUTROPHILS # BLD AUTO: 4.19 X10 (3) UL (ref 1.5–7.7)
NEUTROPHILS # BLD AUTO: 4.19 X10(3) UL (ref 1.5–7.7)
NEUTROPHILS # BLD AUTO: 4.8 X10ˆ3/UL (ref 1.5–7.7)
NEUTROPHILS NFR BLD AUTO: 55.7 %
NEUTROPHILS NFR BLD AUTO: 58.6 %
NITRITE UR QL STRIP: NEGATIVE
PH UR STRIP: 6.5 [PH]
PLATELET # BLD AUTO: 154 10(3)UL (ref 150–450)
PLATELETS.RETICULATED NFR BLD AUTO: 5.1 % (ref 0–7)
POTASSIUM BLD-SCNC: 3.8 MMOL/L (ref 3.6–5.1)
PROT UR STRIP-MCNC: NEGATIVE MG/DL
RBC # BLD AUTO: 4.37 X10(6)UL (ref 4.3–5.7)
RBC # BLD AUTO: 4.49 X10ˆ6/UL (ref 4.3–5.7)
SODIUM BLD-SCNC: 136 MMOL/L (ref 136–145)
SP GR UR STRIP: 1.01
UROBILINOGEN UR STRIP-ACNC: <2 MG/DL
WBC # BLD AUTO: 7.5 X10(3) UL (ref 4–11)
WBC # BLD AUTO: 8.2 X10ˆ3/UL (ref 4–11)

## 2025-07-03 PROCEDURE — 74176 CT ABD & PELVIS W/O CONTRAST: CPT | Performed by: NURSE PRACTITIONER

## 2025-07-03 RX ORDER — LIDOCAINE 50 MG/G
1 PATCH TOPICAL EVERY 24 HOURS
Qty: 9 EACH | Refills: 0 | Status: SHIPPED | OUTPATIENT
Start: 2025-07-03

## 2025-07-03 RX ORDER — METHYLPREDNISOLONE 4 MG/1
TABLET ORAL
Qty: 1 EACH | Refills: 0 | Status: SHIPPED | OUTPATIENT
Start: 2025-07-03

## 2025-07-03 RX ORDER — SERTRALINE HYDROCHLORIDE 100 MG/1
100 TABLET, FILM COATED ORAL DAILY
COMMUNITY
Start: 2023-12-19 | End: 2025-07-03

## 2025-07-03 RX ORDER — CYCLOBENZAPRINE HCL 10 MG
TABLET ORAL
COMMUNITY
Start: 2025-07-02

## 2025-07-03 NOTE — ED PROVIDER NOTES
Patient Seen in: [facility]        History  Chief Complaint   Patient presents with    Back Pain     Stated Complaint: back pain    Subjective:   HPI     Antonio Simpson is a 60 year old male who presents with bilateral back pain.    He has been experiencing bilateral back pain for the past three weeks, located towards the kidneys on both sides without radiation. There is no history of recent injury or fall. Previous consultation suggested muscle spasms, and muscle relaxers were prescribed but did not provide relief.    He has a history of a small kidney stone identified approximately five to six years ago, which was considered too small for intervention. He is unsure if he passed the stone. No current symptoms of hematuria, dysuria, or abdominal pain are present.    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 Surgery was considered for his upper neck due to dislocation and a pinched spine.    He experiences pain with movements especially bending back from a forward position and twisting motions.  A review of pertinent red flag issues reveals no history of fever, IV drug use, urinary retention, history of immunosuppressive therapy, history of cancer or weight loss. No saddle anesthesia, perianal numbness, stool/bladder incontinence.         Objective:     No pertinent past medical history.            No pertinent past surgical history.              No pertinent social history.            Review of Systems    Positive for stated complaint: back pain  Other systems are as noted in HPI.  Constitutional and vital signs reviewed.      All other systems reviewed and negative except as noted above.                  Physical Exam    ED Triage Vitals   BP 07/03/25 1326 131/82   Pulse 07/03/25 1326 87   Resp 07/03/25 1326 16   Temp 07/03/25 1326 97.8  °F (36.6 °C)   Temp src 07/03/25 1326 Oral   SpO2 07/03/25 1326 96 %   O2 Device 07/03/25 1325 None (Room air)       Current Vitals:   Vital Signs  BP: 131/82  Pulse: 87  Resp: 16  Temp: 97.8 °F (36.6 °C)  Temp src: Oral    Oxygen Therapy  SpO2: 96 %  O2 Device: None (Room air)            Physical Exam  General: Patient appears in mild distress, slow to transition, thin  Neck: Supple, full range of motion, no midline bony tenderness  Abdomen: Soft nontender nondistended, no mass or prominent aortic pulsation  Back: Tender to palpation in lumbar paraspinal region,= diffusely to bilat mid and lumbar region no midline bony tenderness, no erythema, decreased range of motion secondary to pain and spasm  Straight leg tests: Negative bilateral  Neuro: Patient is alert and oriented x3, able to ambulate with steady gait, 5 out of 5 strength bilateral lower extremities hips knees and ankle flexion and extension, dorsiflexion and plantarflexion of the foot preserved bilateral 5 out of 5 strength, sensation intact from sacral region throughout lower extremities down to the dorsal surface of the foot  Extremities: Nontender full range of motion in bilateral lower extremities, no edema,       ED Course  Labs Reviewed   POCT ISTAT CHEM8 CARTRIDGE - Abnormal; Notable for the following components:       Result Value    ISTAT BUN 5 (*)     All other components within normal limits   CBC W AUTO DIFF   POCT CBC   [facility] POCT URINALYSIS DIPSTICK     Results  LABS  Blood work: Renal function tests within normal limits, no leukocytosis (07/03/2025)    RADIOLOGY  CT scan: Small renal calculus, non-obstructive (07/03/2025)                          [facility]             Medical Decision Making    Assessment & Plan  Back pain  Chronic bilateral lower back pain for three weeks, possibly related to herniated disc. CT scan and blood work ruled out kidney stones and infection. Pain exacerbated by movement  - Order blood work to check white blood cell  count and kidney function.  - Perform urinalysis to rule out infection.  - Prescribe Medrol Dosepak for potential nerve-based pain.  - Prescribe lidocaine patch for localized pain relief.  -  Ibuprofen prn for pain management.  - Advise follow-up with a specialist for potential MRI to assess for herniated disc.  Declines xrays in IC  Herniated disc  Suspected herniated disc contributing to bilateral back pain. Pain exacerbated by movement   - Prescribe Medrol Dosepak   - Recommend follow-up with a specialist for MRI to evaluate disc herniation and nerve involvement.    Kidney stone  Small non-obstructing stone present in the kidney. No symptoms of passage such as hematuria or dysuria. CT scan confirmed non-obstructing stone.  - Monitor kidney stone as it is non-obstructing and not causing current symptoms.     Disposition and Plan     Clinical Impression:  1. Back pain without radiation         Disposition:  Discharge  7/3/2025  3:02 pm    Follow-up:  [facility]   [phone]  option 3  Schedule an appointment as soon as possible for a visit   If symptoms worsen go to the ER          Medications Prescribed:  Discharge Medication List as of 7/3/2025  3:03 PM        START taking these medications    Details   lidocaine 5 % External Patch Place 1 patch onto the skin daily., Normal, Disp-9 each, R-0      methylPREDNISolone (MEDROL) 4 MG Oral Tablet Therapy Pack Dosepack: take as directed, Normal, Disp-1 each, R-0                   Supplementary Documentation:

## 2025-07-03 NOTE — ED INITIAL ASSESSMENT (HPI)
Pt with co back pain for the last 3 weeks. Saw pcp and was given muscle relaxers but that is not helping. Pt feels like it is his kidney. Pain is bilat. Pt took 1 flexeril and said it didn't work.

## 2025-07-03 NOTE — DISCHARGE INSTRUCTIONS
Rest  Ice alternate heat  Ibuprofen every 6-8 hours as needed for pain take with food  If symptoms are not improving advise close follow-up with your doctor for further testing for any new or worsening symptoms advise ER for higher level of care

## (undated) DEVICE — CAP,BOUFFANT,SPUNBOND,BLUE,24": Brand: MEDLINE INDUSTRIES, INC.

## (undated) DEVICE — GLOVE SURG SENSICARE SZ 7-1/2

## (undated) DEVICE — BANDAID COVERLET 1X3

## (undated) DEVICE — AVANOS* TUOHY EPIDURAL NEEDLE: Brand: AVANOS

## (undated) DEVICE — GLOVE SURG SENSICARE SZ 6-1/2

## (undated) DEVICE — PAIN TRAY: Brand: MEDLINE INDUSTRIES, INC.

## (undated) DEVICE — Device

## (undated) DEVICE — 3M™ TEGADERM™ TRANSPARENT FILM DRESSING, 1626W, 4 IN X 4-3/4 IN (10 CM X 12 CM), 50 EACH/CARTON, 4 CARTON/CASE: Brand: 3M™ TEGADERM™

## (undated) DEVICE — GLOVE SURG SENSICARE SZ 7

## (undated) DEVICE — REMOVER DURAPREP 3M

## (undated) NOTE — LETTER
Date & Time: 7/3/2025, 3:01 PM  Patient: Antonio Simpson  Encounter Provider(s):    Julisa Guevara APRN       To Whom It May Concern:    Antonio Simpson was seen and treated in our department on 7/3/2025. He should not return to work until 7/5/25 but advise light duty no heavy lifting or aggressive activity this week.    If you have any questions or concerns, please do not hesitate to call.        _____________________________  Physician/APC Signature

## (undated) NOTE — LETTER
Date: 2020    Patient Name: Dada Gambino                           - 1965          To Whom it may concern:     This letter has been written at the patient's request. The above patient was cared for at the [facility] for treatmen